# Patient Record
Sex: FEMALE | Race: WHITE | NOT HISPANIC OR LATINO | Employment: FULL TIME | URBAN - NONMETROPOLITAN AREA
[De-identification: names, ages, dates, MRNs, and addresses within clinical notes are randomized per-mention and may not be internally consistent; named-entity substitution may affect disease eponyms.]

---

## 2017-01-12 ENCOUNTER — HOSPITAL ENCOUNTER (OUTPATIENT)
Dept: NUCLEAR MEDICINE | Facility: HOSPITAL | Age: 37
Discharge: HOME | End: 2017-01-12

## 2017-01-12 DIAGNOSIS — E05.90 HYPERTHYROIDISM: ICD-10-CM

## 2017-01-12 RX ORDER — SODIUM IODIDE I 123 100 UCI/1
1 CAPSULE, GELATIN COATED ORAL
Status: DISCONTINUED | OUTPATIENT
Start: 2017-01-12 | End: 2017-01-13 | Stop reason: HOSPADM

## 2017-01-13 ENCOUNTER — HOSPITAL ENCOUNTER (OUTPATIENT)
Dept: NUCLEAR MEDICINE | Facility: HOSPITAL | Age: 37
Discharge: HOME | End: 2017-01-13

## 2017-01-13 PROCEDURE — 0 SODIUM IODIDE 3.7 MBQ CAPSULE

## 2017-01-13 PROCEDURE — A9516 IODINE I-123 SOD IODIDE MIC: HCPCS

## 2017-01-23 ENCOUNTER — OFFICE VISIT (OUTPATIENT)
Dept: OTOLARYNGOLOGY | Facility: CLINIC | Age: 37
End: 2017-01-23

## 2017-01-23 VITALS
WEIGHT: 227 LBS | HEART RATE: 86 BPM | BODY MASS INDEX: 42.86 KG/M2 | SYSTOLIC BLOOD PRESSURE: 131 MMHG | RESPIRATION RATE: 22 BRPM | DIASTOLIC BLOOD PRESSURE: 82 MMHG | HEIGHT: 61 IN

## 2017-01-23 DIAGNOSIS — R13.10 DYSPHAGIA, UNSPECIFIED TYPE: Primary | ICD-10-CM

## 2017-01-23 DIAGNOSIS — R06.83 SNORING: ICD-10-CM

## 2017-01-23 DIAGNOSIS — J34.3 HYPERTROPHY OF INFERIOR NASAL TURBINATE: ICD-10-CM

## 2017-01-23 DIAGNOSIS — E05.90 HYPERTHYROIDISM: ICD-10-CM

## 2017-01-23 DIAGNOSIS — E01.0 THYROMEGALY: ICD-10-CM

## 2017-01-23 DIAGNOSIS — K21.9 LARYNGOPHARYNGEAL REFLUX (LPR): ICD-10-CM

## 2017-01-23 PROCEDURE — 99214 OFFICE O/P EST MOD 30 MIN: CPT | Performed by: NURSE PRACTITIONER

## 2017-01-23 NOTE — PROGRESS NOTES
YOB: 1980  Location: [facility]  Location Address: [address] [place] [address], [facility], [address] [place] [address]  Location Phone: [phone]    Chief Complaint   Patient presents with   • Follow-up     sore throat        History of Present Illness  Teena Lopez is a 36 y.o. female.  Teena Lopez is here for evaluation of ENT complaints. The patient has had problems with throat fullness, difficulty swallowing  The symptoms are localized to the central throat.. The patient has had severe symptoms. The symptoms have been present for the last several months . The symptoms are aggravated by  no identifiable factors . The symptoms are improved by antibiotics.  Positive for frequent untreated GERD.   She is positive for snoring negative for ZAC RHI 10.  She is positive for globus sensation. She threw up 5-8 times per day when she was pregnant.  Positive for loud snoring adriana when she is very tired and awakens still sleepy.  Reports she cannot swallow any meat.  She continues to have persistent diff swallowing.     TSH .4  FL Esophagram Complete [[id number]] (Order [id number])   Status: Final result   PACS Images   Radiology Images   Study Result   FL ESOPHAGRAM COMPLETE- 2016 9:12 AM CST      REASON FOR EXAM: dysphagia, food getting stuck; E05.90-Thyrotoxicosis,  unspecified without thyrotoxic crisis or storm      COMPARISON: NONE       FLUORO TIME: 0.6 minutes      NUMBER OF IMAGES: 40      TECHNIQUE: Multiple fluoroscopic spot images and video fluoroscopy were  performed after the oral administration of barium contrast.       FINDINGS: Ingestion of barium contrast under fluoroscopic observation  demonstrates primary peristaltic waves of the esophagus quickly clearing  the barium contrast into the stomach. Mucosal pattern and morphology of  the esophagus is normal. No gastroesophageal reflux was elicited during  the time of examination. There is no definite stricture. However, just  distal to the epiglottis,  there is a short segment of esophageal lumen  that does not distend on any of the images.      IMPRESSION:  No mechanical obstruction is definitively visualized. There is a short  segment of esophagus that does not completely distend in the cervical  esophagus, just distal to the epiglottis. GI consultation is  recommended.  This report was finalized on 2016 09:26 by Dr. Eduardo Navarrete MD.     NM thyroid uptake and scan [[id number]] (Order [id number])   Status: Final result   PACS Images   Radiology Images   Study Result   HISTORY: Hyperthyroidism      FINDINGS: Following ingestion of 480 uCi of I-123 a 4 hour and 24-hour  uptake were calculated subtracting background activity. The 4 hour  uptake is 8.3% and 24-hour uptake is 18.4% both of which are within  normal limits. Planar imaging was also obtained of the thyroid gland  demonstrating homogeneous uptake of the radiopharmaceutical. The thyroid  gland is mildly enlarged with the right lobe measuring 5.6 cm and left  lobe 5.5 cm in eanc-rn-hefl length. No discrete hot or cold nodules are  demonstrated.      IMPRESSION:  1.. Normal 4 and 24 hour uptake following ingestion of the radioactive  capsule.  2. Mild enlargement of the thyroid gland with homogeneous uptake of the  radiopharmaceutical. No discrete hot or cold nodules are present.  This report was finalized on 2017 12:51 by Dr. Chai Toure MD.          Past Medical History   Diagnosis Date   • Allergic rhinitis    • Snoring    • Strep throat        Past Surgical History   Procedure Laterality Date   • Excision lesion       leg   • Cholecystectomy     •  section       x 2         Current Outpatient Prescriptions:   •  omeprazole (priLOSEC) 20 MG capsule, Take 20 mg by mouth 2 (Two) Times a Day., Disp: , Rfl:     Keflex [cephalexin] and Penicillins    Family History   Problem Relation Age of Onset   • No Known Problems Mother        Social History     Social History   • Marital status:       Spouse name: N/A   • Number of children: N/A   • Years of education: N/A     Occupational History   • Not on file.     Social History Main Topics   • Smoking status: Never Smoker   • Smokeless tobacco: Not on file   • Alcohol use No   • Drug use: Defer   • Sexual activity: Not on file     Other Topics Concern   • Not on file     Social History Narrative       Review of Systems   Constitutional: Negative.    HENT:        Strep throat, snoring, reflux, globus sensation   Eyes: Negative.    Cardiovascular: Negative.    Gastrointestinal: Negative.    Endocrine: Negative.    Genitourinary: Negative.    Musculoskeletal: Negative.    Skin: Negative.    Allergic/Immunologic: Negative.    Neurological: Negative.    Hematological: Negative.    Psychiatric/Behavioral: Negative.        Vitals:    01/23/17 1112   BP: 131/82   Pulse: 86   Resp: 22       Objective     Physical Exam    CONSTITUTIONAL: well nourished, overweight alert, oriented, in no acute distress     COMMUNICATION AND VOICE: able to communicate normally, normal voice quality    HEAD: normocephalic, no lesions, atraumatic, no tenderness, no masses     FACE: appearance normal, no lesions, no tenderness, no deformities, facial motion symmetric    SALIVARY GLANDS: parotid glands with no tenderness, no swelling, no masses, submandibular glands with normal size, nontender    EYES: ocular motility normal, eyelids normal, orbits normal, no proptosis, conjunctiva normal , pupils equal, round     EARS:  Hearing: response to conversational voice normal bilaterally   External Ears: auricles without lesions  Otoscopic: tympanic membrane appearance normal, no lesions, no perforation, normal mobility, no fluid    NOSE:  External Nose: structure normal, no tenderness on palpation, no nasal discharge, no lesions, no evidence of trauma, nostrils patent   Intranasal Exam: nasal mucosa edema, vestibule within normal limits, inferior turbinate hypertrophy, nasal septum  midline       ORAL:  Lips: upper and lower lips without lesion   Teeth: dentition within normal limits for age   Gums: gingivae healthy   Oral Mucosa: oral mucosa normal, no mucosal lesions   Floor of Mouth: Warthin’s duct patent, mucosa normal  Tongue: lingual mucosa normal without lesions, normal tongue mobility   Palate: soft and hard palates with normal mucosa and structure  Oropharynx: oropharyngeal mucosa normal    HYPOPHARYNX:   LARYNX: deferred    NECK: large body habitus    THYROID: nodules not palpable, thyroid mildly enlarged    LYMPH NODES: no lymphadenopathy    CHEST/RESPIRATORY: respiratory effort normal, normal breath sounds     CARDIOVASCULAR: rate and rhythm normal, extremities without cyanosis or edema      NEUROLOGIC/PSYCHIATRIC: oriented to time, place and person, mood normal, affect appropriate, CN II-XII intact grossly    Assessment/Plan   Teena was seen today for follow-up.    Diagnoses and all orders for this visit:    Dysphagia, unspecified type  -     Ambulatory Referral to Gastroenterology    Thyromegaly    Laryngopharyngeal reflux (LPR)    Hyperthyroidism    Snoring    Hypertrophy of inferior nasal turbinate      * Surgery not found *  Orders Placed This Encounter   Procedures   • Ambulatory Referral to Gastroenterology     Referral Priority:   Routine     Referral Type:   Consultation     Referral Reason:   Specialty Services Required     Referred to Provider:   Warren Macdonald MD     Requested Specialty:   Gastroenterology     Number of Visits Requested:   1     Return in about 1 year (around 1/23/2018).       Patient Instructions   Referral to GI for further evaluation  Will recheck TSH/US in 1 year.    Call for problems or worsening symptoms  Snoring - nonobstructive.  Nasacort

## 2017-01-23 NOTE — PATIENT INSTRUCTIONS
Referral to GI for further evaluation  Will recheck TSH/US in 1 year.    Call for problems or worsening symptoms  Snoring - nonobstructive.  Nasacort

## 2017-01-23 NOTE — MR AVS SNAPSHOT
"Teena Lopez   2017 11:15 AM   Office Visit    Dept Phone:  [phone]   Encounter #:  [id number]    Provider:  ANASTASIIA Pollard   Department:  [facility]                Your Full Care Plan              Your Updated Medication List          This list is accurate as of: 17 11:34 AM.  Always use your most recent med list.                omeprazole 20 MG capsule   Commonly known as:  priLOSEC               We Performed the Following     Ambulatory Referral to Gastroenterology       You Were Diagnosed With        Codes Comments    Dysphagia, unspecified type    -  Primary ICD-10-CM: R13.10  ICD-9-CM: 787.20       Instructions     None    Patient Instructions History      Upcoming Appointments     Visit Type Date Time Department    FOLLOW UP 2017 11:15 AM [facility]      iMER Signup     [facility] iMER allows you to send messages to your doctor, view your test results, renew your prescriptions, schedule appointments, and more. To sign up, go to [employer] and click on the Sign Up Now link in the New User? box. Enter your iMER Activation Code exactly as it appears below along with the last four digits of your Social Security Number and your Date of Birth () to complete the sign-up process. If you do not sign up before the expiration date, you must request a new code.    iMER Activation Code: [email or web address]  Expires: 2017  4:35 AM    If you have questions, you can email [email or web address] or call [phone] to talk to our iMER staff. Remember, iMER is NOT to be used for urgent needs. For medical emergencies, dial 911.               Other Info from Your Visit           Allergies     Keflex [Cephalexin]      Penicillins        Reason for Visit     Follow-up sore throat       Vital Signs     Blood Pressure Pulse Respirations Height Weight Body Mass Index    131/82 86 22 61\" (154.9 cm) 227 lb (103 kg) " 42.89 kg/m2    Smoking Status                   Never Smoker           Problems and Diagnoses Noted     Difficulty swallowing    -  Primary

## 2017-02-15 ENCOUNTER — OFFICE VISIT (OUTPATIENT)
Dept: GASTROENTEROLOGY | Facility: CLINIC | Age: 37
End: 2017-02-15

## 2017-02-15 VITALS
BODY MASS INDEX: 43.05 KG/M2 | HEART RATE: 88 BPM | SYSTOLIC BLOOD PRESSURE: 118 MMHG | DIASTOLIC BLOOD PRESSURE: 70 MMHG | OXYGEN SATURATION: 99 % | WEIGHT: 228 LBS | HEIGHT: 61 IN

## 2017-02-15 DIAGNOSIS — R93.3 ABNORMAL ESOPHAGRAM: ICD-10-CM

## 2017-02-15 DIAGNOSIS — R13.10 DIFFICULTY SWALLOWING SOLIDS: Primary | ICD-10-CM

## 2017-02-15 DIAGNOSIS — Z78.9 NONSMOKER: ICD-10-CM

## 2017-02-15 DIAGNOSIS — E66.9 OBESITY, UNSPECIFIED OBESITY SEVERITY, UNSPECIFIED OBESITY TYPE: ICD-10-CM

## 2017-02-15 PROCEDURE — 99203 OFFICE O/P NEW LOW 30 MIN: CPT | Performed by: CLINICAL NURSE SPECIALIST

## 2017-02-15 NOTE — PROGRESS NOTES
Chief Complaint   Patient presents with   • GI Problem     New patient ref by Faviola Stapleton NP for problems swallowing     Subjective   HPI  Teena Lopez is a 36 y.o. female who presents with a complaint of difficulty swallowing.  This has been ongoing persistent over the past 6 months. She has difficulty with eggs, meat and most solid foods. It is located in the upper esophageal region. This occurs daily. No nausea or vomiting. No wt loss. Cutting her food up more and time makes it better. No wt loss. She has had to make herself vomit to get relief. No melena. No BRBPR. She has never had Endoscopy evaluation.  She has had an esophagram 2016 it showed no definite stricture however just distal to the epiglosttis there is a short segment of esophageal lumen that does not distend on any of the images.   Past Medical History   Diagnosis Date   • Allergic rhinitis    • Snoring    • Strep throat      Past Surgical History   Procedure Laterality Date   • Excision lesion       leg   • Cholecystectomy     •  section       x 2     Outpatient Prescriptions Marked as Taking for the 2/15/17 encounter (Office Visit) with ANASTASIIA [facility]   Medication Sig Dispense Refill   • omeprazole (priLOSEC) 20 MG capsule Take 20 mg by mouth 2 (Two) Times a Day.       Allergies   Allergen Reactions   • Keflex [Cephalexin]    • Penicillins      Social History     Social History   • Marital status:      Spouse name: N/A   • Number of children: N/A   • Years of education: N/A     Occupational History   • Not on file.     Social History Main Topics   • Smoking status: Never Smoker   • Smokeless tobacco: Not on file   • Alcohol use No   • Drug use: Defer   • Sexual activity: Not on file     Other Topics Concern   • Not on file     Social History Narrative     Family History   Problem Relation Age of Onset   • No Known Problems Mother    • Colon cancer Neg Hx    • Colon polyps Neg Hx      Health Maintenance   Topic Date  "Due   • TDAP/TD VACCINES (1 - Tdap) 07/16/2001   • INFLUENZA VACCINE  08/01/2016     Review of Systems   Constitutional: Negative for activity change, appetite change, chills, diaphoresis, fatigue, fever and unexpected weight change.   HENT: Positive for trouble swallowing. Negative for ear pain, hearing loss, mouth sores, sore throat and voice change.    Eyes: Negative.    Respiratory: Negative for cough, choking, shortness of breath and wheezing.    Cardiovascular: Negative for chest pain and palpitations.   Gastrointestinal: Negative for abdominal pain, blood in stool, constipation, diarrhea, nausea and vomiting.   Endocrine: Negative for cold intolerance and heat intolerance.   Genitourinary: Negative for decreased urine volume, dysuria, frequency, hematuria and urgency.   Musculoskeletal: Negative for back pain, gait problem and myalgias.   Skin: Negative for color change, pallor and rash.   Allergic/Immunologic: Negative for food allergies and immunocompromised state.   Neurological: Negative for dizziness, tremors, seizures, syncope, weakness, light-headedness, numbness and headaches.   Hematological: Negative for adenopathy. Does not bruise/bleed easily.   Psychiatric/Behavioral: Negative for agitation and confusion. The patient is not nervous/anxious.    All other systems reviewed and are negative.    Objective   Vitals:    02/15/17 1312   BP: 118/70   Pulse: 88   SpO2: 99%   Weight: 228 lb (103 kg)   Height: 61\" (154.9 cm)     Body mass index is 43.08 kg/(m^2).  Physical Exam   Constitutional: She is oriented to person, place, and time. She appears well-developed and well-nourished.   HENT:   Head: Normocephalic and atraumatic.   Eyes: Pupils are equal, round, and reactive to light.   Neck: Normal range of motion. Neck supple. No tracheal deviation present.   Cardiovascular: Normal rate, regular rhythm and normal heart sounds.  Exam reveals no gallop and no friction rub.    No murmur " heard.  Pulmonary/Chest: Effort normal and breath sounds normal. No respiratory distress. She has no wheezes. She has no rales. She exhibits no tenderness.   Abdominal: Soft. Bowel sounds are normal. She exhibits no distension. There is no hepatosplenomegaly. There is no tenderness. There is no rigidity, no rebound and no guarding.   Musculoskeletal: Normal range of motion. She exhibits no edema, tenderness or deformity.   Neurological: She is alert and oriented to person, place, and time. She has normal reflexes.   Skin: Skin is warm and dry. No rash noted. No pallor.   Psychiatric: She has a normal mood and affect. Her behavior is normal. Judgment and thought content normal.     Assessment/Plan   Teena was seen today for gi problem.    Diagnoses and all orders for this visit:    Difficulty swallowing solids    Abnormal esophagram  -     Case Request; Standing  -     Implement Anesthesia Orders Day of Procedure; Standing  -     Obtain Informed Consent; Standing  -     Verify Informed Consent; Standing  -     Case Request    Nonsmoker    Obesity, unspecified obesity severity, unspecified obesity type      ESOPHAGOGASTRODUODENOSCOPY WITH ANESTHESIA (N/A)  EMR Dragon/transcription disclaimer: Much of this encounter note is electronic transcription/translation of spoken language to printed text. The electronic translation of spoken language may be erroneous, or at times, nonsensical words or phrases may be inadvertently transcribed. Although I have reviewed the note for such errors, some may still exist.  Body mass index is 43.08 kg/(m^2).  Return if symptoms worsen or fail to improve.  Instructed on mechanical soft diet.

## 2017-03-13 ENCOUNTER — TELEPHONE (OUTPATIENT)
Dept: GASTROENTEROLOGY | Facility: CLINIC | Age: 37
End: 2017-03-13

## 2017-04-19 ENCOUNTER — OFFICE VISIT (OUTPATIENT)
Dept: FAMILY MEDICINE CLINIC | Age: 37
End: 2017-04-19
Payer: COMMERCIAL

## 2017-04-19 VITALS
HEART RATE: 89 BPM | TEMPERATURE: 98.2 F | HEIGHT: 61 IN | DIASTOLIC BLOOD PRESSURE: 70 MMHG | BODY MASS INDEX: 42.67 KG/M2 | SYSTOLIC BLOOD PRESSURE: 100 MMHG | WEIGHT: 226 LBS | OXYGEN SATURATION: 98 %

## 2017-04-19 DIAGNOSIS — J02.9 SORE THROAT: Primary | ICD-10-CM

## 2017-04-19 DIAGNOSIS — R05.9 COUGH: ICD-10-CM

## 2017-04-19 DIAGNOSIS — J00 HEAD COLD: ICD-10-CM

## 2017-04-19 LAB
INFLUENZA A ANTIBODY: NORMAL
INFLUENZA B ANTIBODY: NORMAL
S PYO AG THROAT QL: NORMAL

## 2017-04-19 PROCEDURE — 87804 INFLUENZA ASSAY W/OPTIC: CPT | Performed by: NURSE PRACTITIONER

## 2017-04-19 PROCEDURE — 87880 STREP A ASSAY W/OPTIC: CPT | Performed by: NURSE PRACTITIONER

## 2017-04-19 PROCEDURE — 99213 OFFICE O/P EST LOW 20 MIN: CPT | Performed by: NURSE PRACTITIONER

## 2017-04-19 RX ORDER — LORATADINE 10 MG/1
10 TABLET ORAL DAILY
Qty: 30 TABLET | Refills: 1 | Status: SHIPPED | OUTPATIENT
Start: 2017-04-19 | End: 2017-08-29 | Stop reason: CLARIF

## 2017-04-19 RX ORDER — FLUTICASONE PROPIONATE 50 MCG
2 SPRAY, SUSPENSION (ML) NASAL DAILY
Qty: 1 BOTTLE | Refills: 1 | Status: SHIPPED | OUTPATIENT
Start: 2017-04-19 | End: 2017-08-29 | Stop reason: CLARIF

## 2017-04-19 ASSESSMENT — ENCOUNTER SYMPTOMS
DIARRHEA: 0
EYES NEGATIVE: 1
ORTHOPNEA: 0
WHEEZING: 0
VOMITING: 0
ABDOMINAL PAIN: 0
NAUSEA: 0
SORE THROAT: 1
SHORTNESS OF BREATH: 0
COUGH: 0

## 2017-08-29 ENCOUNTER — OFFICE VISIT (OUTPATIENT)
Dept: FAMILY MEDICINE CLINIC | Age: 37
End: 2017-08-29
Payer: COMMERCIAL

## 2017-08-29 VITALS
WEIGHT: 226 LBS | DIASTOLIC BLOOD PRESSURE: 82 MMHG | HEIGHT: 61 IN | BODY MASS INDEX: 42.67 KG/M2 | SYSTOLIC BLOOD PRESSURE: 124 MMHG

## 2017-08-29 DIAGNOSIS — Z91.018 FOOD ALLERGY: Primary | ICD-10-CM

## 2017-08-29 DIAGNOSIS — R22.1 THROAT SWELLING: ICD-10-CM

## 2017-08-29 DIAGNOSIS — H65.06 RECURRENT ACUTE SEROUS OTITIS MEDIA OF BOTH EARS: ICD-10-CM

## 2017-08-29 DIAGNOSIS — Z83.79 FAMILY HISTORY OF CELIAC DISEASE: ICD-10-CM

## 2017-08-29 DIAGNOSIS — Z91.018 FOOD ALLERGY: ICD-10-CM

## 2017-08-29 PROCEDURE — 99214 OFFICE O/P EST MOD 30 MIN: CPT | Performed by: NURSE PRACTITIONER

## 2017-08-29 RX ORDER — LORATADINE AND PSEUDOEPHEDRINE 10; 240 MG/1; MG/1
1 TABLET, EXTENDED RELEASE ORAL DAILY PRN
Qty: 20 TABLET | Refills: 2 | Status: ON HOLD | OUTPATIENT
Start: 2017-08-29 | End: 2022-04-25 | Stop reason: ALTCHOICE

## 2017-08-29 ASSESSMENT — ENCOUNTER SYMPTOMS
SPUTUM PRODUCTION: 0
HEMOPTYSIS: 0
RHINORRHEA: 0
GASTROINTESTINAL NEGATIVE: 1
SHORTNESS OF BREATH: 1
COUGH: 0
EYES NEGATIVE: 1
ORTHOPNEA: 0
BACK PAIN: 0

## 2017-08-29 ASSESSMENT — PATIENT HEALTH QUESTIONNAIRE - PHQ9
2. FEELING DOWN, DEPRESSED OR HOPELESS: 0
SUM OF ALL RESPONSES TO PHQ QUESTIONS 1-9: 0
1. LITTLE INTEREST OR PLEASURE IN DOING THINGS: 0
SUM OF ALL RESPONSES TO PHQ9 QUESTIONS 1 & 2: 0

## 2017-09-01 LAB — CELIAC PANEL: 12 UNITS (ref 0–19)

## 2017-09-05 ENCOUNTER — TELEPHONE (OUTPATIENT)
Dept: FAMILY MEDICINE CLINIC | Age: 37
End: 2017-09-05

## 2017-10-10 ENCOUNTER — OFFICE VISIT (OUTPATIENT)
Dept: FAMILY MEDICINE CLINIC | Age: 37
End: 2017-10-10
Payer: COMMERCIAL

## 2017-10-10 ENCOUNTER — APPOINTMENT (OUTPATIENT)
Dept: CT IMAGING | Age: 37
End: 2017-10-10
Payer: COMMERCIAL

## 2017-10-10 ENCOUNTER — APPOINTMENT (OUTPATIENT)
Dept: GENERAL RADIOLOGY | Age: 37
End: 2017-10-10
Payer: COMMERCIAL

## 2017-10-10 ENCOUNTER — HOSPITAL ENCOUNTER (EMERGENCY)
Age: 37
Discharge: HOME | End: 2017-10-10
Attending: FAMILY MEDICINE
Payer: COMMERCIAL

## 2017-10-10 VITALS
TEMPERATURE: 97.8 F | DIASTOLIC BLOOD PRESSURE: 77 MMHG | HEART RATE: 76 BPM | HEIGHT: 61 IN | RESPIRATION RATE: 16 BRPM | BODY MASS INDEX: 41.91 KG/M2 | WEIGHT: 222 LBS | OXYGEN SATURATION: 97 % | SYSTOLIC BLOOD PRESSURE: 101 MMHG

## 2017-10-10 VITALS
HEIGHT: 61 IN | BODY MASS INDEX: 42.1 KG/M2 | DIASTOLIC BLOOD PRESSURE: 70 MMHG | WEIGHT: 223 LBS | SYSTOLIC BLOOD PRESSURE: 108 MMHG

## 2017-10-10 DIAGNOSIS — R55 NEAR SYNCOPE: ICD-10-CM

## 2017-10-10 DIAGNOSIS — R29.898 BILATERAL ARM WEAKNESS: ICD-10-CM

## 2017-10-10 DIAGNOSIS — G89.29 CHRONIC NONINTRACTABLE HEADACHE, UNSPECIFIED HEADACHE TYPE: Primary | ICD-10-CM

## 2017-10-10 DIAGNOSIS — R26.89 STUMBLING GAIT: ICD-10-CM

## 2017-10-10 DIAGNOSIS — R20.0 NUMBNESS: ICD-10-CM

## 2017-10-10 DIAGNOSIS — M54.2 NECK PAIN, BILATERAL: ICD-10-CM

## 2017-10-10 DIAGNOSIS — R07.89 OTHER CHEST PAIN: Primary | ICD-10-CM

## 2017-10-10 DIAGNOSIS — M54.12 CERVICAL RADICULOPATHY: ICD-10-CM

## 2017-10-10 DIAGNOSIS — R29.898 RIGHT ARM WEAKNESS: ICD-10-CM

## 2017-10-10 DIAGNOSIS — H53.9 VISUAL CHANGES: ICD-10-CM

## 2017-10-10 DIAGNOSIS — R51.9 CHRONIC NONINTRACTABLE HEADACHE, UNSPECIFIED HEADACHE TYPE: Primary | ICD-10-CM

## 2017-10-10 DIAGNOSIS — R42 DIZZINESS: ICD-10-CM

## 2017-10-10 LAB
ALBUMIN SERPL-MCNC: 4.2 G/DL (ref 3.5–5.2)
ALP BLD-CCNC: 63 U/L (ref 35–104)
ALT SERPL-CCNC: 18 U/L (ref 5–33)
AMPHETAMINE SCREEN, URINE: NEGATIVE
ANION GAP SERPL CALCULATED.3IONS-SCNC: 15 MMOL/L (ref 7–19)
AST SERPL-CCNC: 15 U/L (ref 5–32)
BARBITURATE SCREEN URINE: NEGATIVE
BASOPHILS ABSOLUTE: 0 K/UL (ref 0–0.2)
BASOPHILS RELATIVE PERCENT: 0.4 % (ref 0–1)
BENZODIAZEPINE SCREEN, URINE: NEGATIVE
BILIRUB SERPL-MCNC: 0.3 MG/DL (ref 0.2–1.2)
BILIRUBIN URINE: NEGATIVE
BLOOD, URINE: NEGATIVE
BUN BLDV-MCNC: 13 MG/DL (ref 6–20)
CALCIUM SERPL-MCNC: 9 MG/DL (ref 8.6–10)
CANNABINOID SCREEN URINE: NEGATIVE
CHLORIDE BLD-SCNC: 102 MMOL/L (ref 98–111)
CLARITY: CLEAR
CO2: 23 MMOL/L (ref 22–29)
COCAINE METABOLITE SCREEN URINE: NEGATIVE
COLOR: YELLOW
CREAT SERPL-MCNC: 0.6 MG/DL (ref 0.5–0.9)
EOSINOPHILS ABSOLUTE: 0.2 K/UL (ref 0–0.6)
EOSINOPHILS RELATIVE PERCENT: 1.8 % (ref 0–5)
GFR NON-AFRICAN AMERICAN: >60
GLUCOSE BLD-MCNC: 84 MG/DL (ref 74–109)
GLUCOSE URINE: NEGATIVE MG/DL
HCT VFR BLD CALC: 37.6 % (ref 37–47)
HEMOGLOBIN: 12.7 G/DL (ref 12–16)
KETONES, URINE: NEGATIVE MG/DL
LEUKOCYTE ESTERASE, URINE: NEGATIVE
LYMPHOCYTES ABSOLUTE: 3 K/UL (ref 1.1–4.5)
LYMPHOCYTES RELATIVE PERCENT: 31.7 % (ref 20–40)
Lab: NORMAL
MCH RBC QN AUTO: 30.5 PG (ref 27–31)
MCHC RBC AUTO-ENTMCNC: 33.8 G/DL (ref 33–37)
MCV RBC AUTO: 90.2 FL (ref 81–99)
MONOCYTES ABSOLUTE: 0.7 K/UL (ref 0–0.9)
MONOCYTES RELATIVE PERCENT: 7.2 % (ref 0–10)
NEUTROPHILS ABSOLUTE: 5.6 K/UL (ref 1.5–7.5)
NEUTROPHILS RELATIVE PERCENT: 58.7 % (ref 50–65)
NITRITE, URINE: NEGATIVE
OPIATE SCREEN URINE: NEGATIVE
PDW BLD-RTO: 12.6 % (ref 11.5–14.5)
PERFORMED ON: NORMAL
PERFORMED ON: NORMAL
PH UA: 6
PLATELET # BLD: 301 K/UL (ref 130–400)
PMV BLD AUTO: 9.5 FL (ref 9.4–12.3)
POC TROPONIN I: 0 NG/ML (ref 0–0.08)
POC TROPONIN I: 0 NG/ML (ref 0–0.08)
POTASSIUM SERPL-SCNC: 3.9 MMOL/L (ref 3.5–5)
PROTEIN UA: NEGATIVE MG/DL
RBC # BLD: 4.17 M/UL (ref 4.2–5.4)
SODIUM BLD-SCNC: 140 MMOL/L (ref 136–145)
SPECIFIC GRAVITY UA: 1.01
TOTAL PROTEIN: 7.3 G/DL (ref 6.6–8.7)
UROBILINOGEN, URINE: 0.2 E.U./DL
WBC # BLD: 9.6 K/UL (ref 4.8–10.8)

## 2017-10-10 PROCEDURE — 80307 DRUG TEST PRSMV CHEM ANLYZR: CPT

## 2017-10-10 PROCEDURE — 6360000002 HC RX W HCPCS: Performed by: FAMILY MEDICINE

## 2017-10-10 PROCEDURE — 85025 COMPLETE CBC W/AUTO DIFF WBC: CPT

## 2017-10-10 PROCEDURE — 71010 XR CHEST PORTABLE: CPT

## 2017-10-10 PROCEDURE — 99285 EMERGENCY DEPT VISIT HI MDM: CPT

## 2017-10-10 PROCEDURE — 72125 CT NECK SPINE W/O DYE: CPT

## 2017-10-10 PROCEDURE — 80053 COMPREHEN METABOLIC PANEL: CPT

## 2017-10-10 PROCEDURE — 99214 OFFICE O/P EST MOD 30 MIN: CPT | Performed by: NURSE PRACTITIONER

## 2017-10-10 PROCEDURE — 70450 CT HEAD/BRAIN W/O DYE: CPT

## 2017-10-10 PROCEDURE — 84484 ASSAY OF TROPONIN QUANT: CPT

## 2017-10-10 PROCEDURE — 81003 URINALYSIS AUTO W/O SCOPE: CPT

## 2017-10-10 PROCEDURE — 36415 COLL VENOUS BLD VENIPUNCTURE: CPT

## 2017-10-10 PROCEDURE — 93005 ELECTROCARDIOGRAM TRACING: CPT

## 2017-10-10 PROCEDURE — 96374 THER/PROPH/DIAG INJ IV PUSH: CPT

## 2017-10-10 PROCEDURE — 99283 EMERGENCY DEPT VISIT LOW MDM: CPT | Performed by: EMERGENCY MEDICINE

## 2017-10-10 RX ORDER — LORAZEPAM 1 MG/1
1 TABLET ORAL EVERY 8 HOURS PRN
Qty: 15 TABLET | Refills: 0 | Status: SHIPPED | OUTPATIENT
Start: 2017-10-10 | End: 2017-11-09

## 2017-10-10 RX ORDER — LORAZEPAM 2 MG/ML
1 INJECTION INTRAMUSCULAR ONCE
Status: COMPLETED | OUTPATIENT
Start: 2017-10-10 | End: 2017-10-10

## 2017-10-10 RX ADMIN — LORAZEPAM 1 MG: 2 INJECTION INTRAMUSCULAR; INTRAVENOUS at 20:20

## 2017-10-10 ASSESSMENT — ENCOUNTER SYMPTOMS
HEMATOCHEZIA: 0
EYES NEGATIVE: 1
COUGH: 0
ABDOMINAL PAIN: 0
RESPIRATORY NEGATIVE: 1
DIARRHEA: 0
NAUSEA: 0
HEMOPTYSIS: 0
GASTROINTESTINAL NEGATIVE: 1
SPUTUM PRODUCTION: 0
VOMITING: 0
BACK PAIN: 0
ORTHOPNEA: 0
VISUAL CHANGE: 0
SHORTNESS OF BREATH: 0
COUGH: 0

## 2017-10-10 ASSESSMENT — PAIN DESCRIPTION - LOCATION: LOCATION: CHEST

## 2017-10-10 ASSESSMENT — PAIN SCALES - GENERAL
PAINLEVEL_OUTOF10: 2
PAINLEVEL_OUTOF10: 5

## 2017-10-10 NOTE — PROGRESS NOTES
Subjective:      Patient ID: Anuradha Casiano is a 30 y.o. female    Patient presents for follow up on several issues. She has seen the allergy doctor and had testing and wasn't found to have any allergies, including no PCN allergy she always thought she had. She went back to her thyroid doctor and had an ultrasound due to increased neck size. She was found to have three small fluid filled cysts, but nothing significant. She is reporting issues with neck pain, both on the side in the area of her cervical lymph nodes and pain in the back of her neck. She reports stress at work, but she doesn't know if that is causing the issues. She reports increased headaches. When she has them they are mostly either left sided or occipital.  It feels like a stabbing/throbbing pain. She is light and noise sensitive with the headaches. She also reports several recent episodes of \"spells\". She states that she will have sudden onset of headache, visual disturbance where she feels she cannot see, everything is blurry and then she will feel like she is going to pass out, everything lindsay goes black, but she doesn't fully pass out. She is also noticing heaviness and weakness in her arms. They tingle and feel weak bilaterally, right worse than left. She states that her  has noticed that when she walks she seems to have a stumbling gait. She will also seem to notice at times that she has trouble talking, her speech seems thick. Review of Systems   Constitutional: Positive for malaise/fatigue. Negative for weight loss. HENT: Negative. Negative for congestion, ear pain and nosebleeds. Eyes: Negative. Respiratory: Negative. Negative for cough, hemoptysis and sputum production. Cardiovascular: Negative. Negative for chest pain, palpitations, orthopnea and claudication. Gastrointestinal: Negative. Genitourinary: Negative. Negative for dysuria, frequency and hematuria.    Musculoskeletal: Positive for She has no cervical adenopathy (no palpable nodes,but she is tender along the left cervical lymph node chain). Neurological: She is alert and oriented to person, place, and time. Skin: Skin is warm and dry. No rash noted. No erythema. Psychiatric: Her behavior is normal. Thought content normal. Her mood appears anxious. She exhibits a depressed mood. /70   Ht 5' 1\" (1.549 m)   Wt 223 lb (101.2 kg)   BMI 42.14 kg/m²     Assessment:      1. Chronic nonintractable headache, unspecified headache type    2. Visual changes    3. Near syncope    4. Dizziness    5. Bilateral arm weakness    6. Neck pain, bilateral    7. Cervical radiculopathy    8. Right arm weakness    9. Stumbling gait            Plan:          Orders Placed This Encounter   Procedures    MRI Brain W WO Contrast     Standing Status:   Future     Standing Expiration Date:   10/10/2018     Scheduling Instructions:      Anupam May not Monday or Wednesday next week    MRI Cervical Spine Wo Conrast     Standing Status:   Future     Standing Expiration Date:   10/10/2018     Scheduling Instructions:      Anupam Inmanone on a Tuesday or thursday       Follow up based on test results, may need referral to neurology for further evaluation as well.

## 2017-10-11 LAB
EKG P AXIS: 13 DEGREES
EKG P AXIS: 31 DEGREES
EKG P-R INTERVAL: 132 MS
EKG P-R INTERVAL: 136 MS
EKG Q-T INTERVAL: 384 MS
EKG Q-T INTERVAL: 388 MS
EKG QRS DURATION: 100 MS
EKG QRS DURATION: 96 MS
EKG QTC CALCULATION (BAZETT): 412 MS
EKG QTC CALCULATION (BAZETT): 413 MS
EKG T AXIS: -1 DEGREES
EKG T AXIS: 3 DEGREES

## 2017-10-11 NOTE — ED NOTES
Assessment:    Pt respirations even and unlabored, skin color within normal limits. Skin is warm and dry. Capillary refill less than 2 seconds. Pt reports numbness in right arm with weakness that has been ongoing. Saw PCP today for this. States outpt MRI of brain and c spine ordered. After she left the office, she developed numbness in left arm as well. Pt reports then experiencing neck pain that went down into upper chest.     Bowel sounds within normal limits. Abdomen soft and nontender. Alert and oriented x 4. Pt is in no acute distress. No edema noted to extremities. Pt CORDOVA but states her hands and arms feel weak and numb.  weak but otherwise strength intact.         Anibal Smith RN  10/10/17 1914

## 2017-10-11 NOTE — ED PROVIDER NOTES
[facility] EMERGENCY DEPT  eMERGENCY dEPARTMENT eNCOUnter      Pt Name: Yanna Aleman  MRN: [id number]  [place] 1980  Date of evaluation: 10/10/2017  Provider: MD Arnoldo Tipton       Chief Complaint   Patient presents with    Chest Pain    Numbness     right and left arm          HISTORY OF PRESENT ILLNESS   (Location/Symptom, Timing/Onset, Context/Setting, Quality, Duration, Modifying Factors, Severity)  Note limiting factors. Yanna Aleman is a 30 y.o. female who presents to the emergency department with R arm tingling/numbness since yesterday that has now spread to L arm as well starting today. Also had MRI scheduled for Tuesday with PCP for head and neck and would like to get it done today but discussed difficulty getting MRI at this time of day in ER unless medically warranted and decided to wait until CT head completed prior to seeing if MRI needed. The history is provided by the patient and the spouse. No  was used.    Extremity Weakness   Severity:  Mild (tingling/numbness)  Onset quality:  Sudden  Timing:  Constant  Progression:  Worsening  Chronicity:  New  Context: not alcohol use, not allergies, not change in medication, not decreased sleep, not dehydration, not drug use, not increased activity, not pinched nerve, not recent infection, not stress and not urinary tract infection    Relieved by:  Nothing  Worsened by:  Nothing  Ineffective treatments:  None tried  Associated symptoms: extremity numbness    Associated symptoms: no abdominal pain, no anorexia, no aphasia, no arthralgias, no ataxia, no chest pain, no cough, no diarrhea, no difficulty walking, no dizziness, no drooling, no dysphagia, no dysuria, no falls, no fever, no foul-smelling urine, no frequency, no headaches, no hematochezia, no lethargy, no loss of consciousness, no melena, no myalgias, no nausea, no near-syncope, no seizures, no sensory-motor deficit, no shortness of breath, no stroke symptoms, no syncope, no urgency, no vision change and no vomiting    Risk factors: no anemia, no congestive heart failure, no coronary artery disease, no diabetes, no excessive menstruation, no family hx of stroke, no heart disease, no neurologic disease, no new medications and no recent stressors        Nursing Notes were reviewed. REVIEW OF SYSTEMS    (2-9 systems for level 4, 10 or more for level 5)     Review of Systems   Constitutional: Negative for fever. HENT: Negative for drooling. Respiratory: Negative for cough and shortness of breath. Cardiovascular: Negative for chest pain, syncope and near-syncope. Gastrointestinal: Negative for abdominal pain, anorexia, diarrhea, dysphagia, hematochezia, melena, nausea and vomiting. Genitourinary: Negative for dysuria, frequency and urgency. Musculoskeletal: Negative for arthralgias, falls and myalgias. Neurological: Negative for dizziness, seizures, loss of consciousness and headaches. All other systems reviewed and are negative. PAST MEDICAL HISTORY   No past medical history on file. SURGICAL HISTORY       Past Surgical History:   Procedure Laterality Date     SECTION  ,     CHOLECYSTECTOMY      LEG SURGERY      right, mass, normal    TUBAL LIGATION           CURRENT MEDICATIONS       Previous Medications    LORATADINE-PSEUDOEPHEDRINE (CLARITIN-D 24 HOUR)  MG PER EXTENDED RELEASE TABLET    Take 1 tablet by mouth daily as needed (allergies and congestion)       ALLERGIES     Review of patient's allergies indicates no known allergies. FAMILY HISTORY     No family history on file.        SOCIAL HISTORY       Social History     Social History    Marital status:      Spouse name: N/A    Number of children: N/A    Years of education: N/A     Social History Main Topics    Smoking status: Never Smoker    Smokeless tobacco: Never Used    Alcohol use No    Drug use: No    Sexual Emergency Department Physician who either signs or Co-signs this chart in the absence of a cardiologist.      RADIOLOGY:   Non-plain film images such as CT, Ultrasound and MRI are read by the radiologist. Plain radiographic images are visualized and preliminarily interpreted by the emergency physician with the below findings:    XR Chest Portable   Final Result   1. No radiographic evidence of acute cardiopulmonary process. Signed by Dr Carlos Armenta on 10/10/2017 7:12 PM      CT Head WO Contrast    (Results Pending)   CT Cervical Spine WO Contrast    (Results Pending)           LABS:  Labs Reviewed   CBC WITH AUTO DIFFERENTIAL - Abnormal; Notable for the following:        Result Value    RBC 4.17 (*)     All other components within normal limits   COMPREHENSIVE METABOLIC PANEL   TROPONIN   URINALYSIS   URINE DRUG SCREEN   POCT TROPONIN   POCT TROPONIN   POCT VENOUS       All other labs were within normal range or not returned as of this dictation. EMERGENCY DEPARTMENT COURSE and DIFFERENTIAL DIAGNOSIS/MDM:   Vitals:    Vitals:    10/10/17 1917 10/10/17 1930 10/10/17 1945 10/10/17 2003   BP: 104/69 109/61 113/71 129/77   Pulse: 76 78 83 77   Resp: 18 16 18 18   Temp:    97.5 °F (36.4 °C)   TempSrc:    Oral   SpO2: 96% 94% 90% 96%   Weight:       Height:           MDM    Reassessment  ***    CONSULTS:  None    PROCEDURES:  Unless otherwise noted below, none     Procedures    FINAL IMPRESSION    No diagnosis found. DISPOSITION/PLAN   DISPOSITION     PATIENT REFERRED TO:  No follow-up provider specified.     DISCHARGE MEDICATIONS:  New Prescriptions    No medications on file          (Please note that portions of this note were completed with a voice recognition program.  Efforts were made to edit the dictations but occasionally words are mis-transcribed.)    Everett Benítez MD (electronically signed)  Attending Emergency Physician

## 2017-10-11 NOTE — ED NOTES
Pt reports numbness improving after ativan, just feeling tired now     Virginia Barillas RN  10/10/17 5154

## 2017-10-11 NOTE — ED PROVIDER NOTES
[facility] EMERGENCY DEPT  eMERGENCY dEPARTMENT eNCOUnter      Pt Name: El Alonso  MRN: [id number]  [place] 1980  Date of evaluation: 10/10/2017  Provider: Selene Dumont MD    [address] [place] [address]       Chief Complaint   Patient presents with    Chest Pain    Numbness     right and left arm          PHYSICAL EXAM    (up to 7 for level 4, 8 or more for level 5)     ED Triage Vitals   BP Temp Temp Source Pulse Resp SpO2 Height Weight   10/10/17 1817 10/10/17 1817 10/10/17 2003 10/10/17 1817 10/10/17 1817 10/10/17 1817 10/10/17 1817 10/10/17 1817   128/79 98 °F (36.7 °C) Oral 81 19 99 % 5' 1\" (1.549 m) 222 lb (100.7 kg)       Physical Exam    DIAGNOSTIC RESULTS     EKG: All EKG's are interpreted by the Emergency Department Physician who either signs or Co-signs this chart in the absence of a cardiologist.    Normal sinus rhythm with no evidence of ischemia. RADIOLOGY:   Non-plain film images such as CT, Ultrasound and MRI are read by the radiologist. Plain radiographic images are visualized and preliminarily interpreted by the emergency physician with the below findings:    I reviewed the findings and shared the results with the patient. CT Cervical Spine WO Contrast   Final Result   1. No evidence of acute osseous injury in the cervical spine. Signed by Dr Mckenna Rhoades on 10/10/2017 8:58 PM      CT Head WO Contrast   Final Result   1. No acute intracranial process. Signed by Dr Mckenna Rhoades on 10/10/2017 8:54 PM      XR Chest Portable   Final Result   1. No radiographic evidence of acute cardiopulmonary process.    Signed by Dr Mckenna Rhoades on 10/10/2017 7:12 PM              LABS:  Labs Reviewed   CBC WITH AUTO DIFFERENTIAL - Abnormal; Notable for the following:        Result Value    RBC 4.17 (*)     All other components within normal limits   COMPREHENSIVE METABOLIC PANEL   URINALYSIS   URINE DRUG SCREEN   TROPONIN   POCT TROPONIN   POCT TROPONIN   POCT VENOUS   POCT VENOUS       All other labs were within normal range or not returned as of this dictation. EMERGENCY DEPARTMENT COURSE and DIFFERENTIAL DIAGNOSIS/MDM:   Vitals:    Vitals:    10/10/17 2003 10/10/17 2018 10/10/17 2045 10/10/17 2100   BP: 129/77 131/76 122/78 126/68   Pulse: 77 89 74 83   Resp: 18 18 16 16   Temp: 97.5 °F (36.4 °C)      TempSrc: Oral      SpO2: 96% 96% 97% 96%   Weight:       Height:           MDM    Reassessment  45-year-old female with atypical numbness right and left arm one side to the other. Scheduled for an MRI next Tuesday. Negative CT tonight. Develop some chest symptoms as well but cardiac workup was negative. She has a primary care provider she is following C safe to discharge this time off at her some Ativan just for extreme stress. She states she has a very stressful job but doesn't think she is anxious. She states she did have a panic attack. We discussed that he don't have to be having panic attacks or have a anxiety diagnosis to succumb to stress. She understands the prescription is not a long-term prescription. CONSULTS:  None    PROCEDURES:  Unless otherwise noted below, none     Procedures    FINAL IMPRESSION      1. Other chest pain    2. Numbness          DISPOSITION/PLAN   DISPOSITION Decision To Discharge    PATIENT REFERRED TO:  Emanuel Sullivan, APRN  [address] [facility]  [phone]      keep your appointment for your MRI. DISCHARGE MEDICATIONS:  New Prescriptions    LORAZEPAM (ATIVAN) 1 MG TABLET    Take 1 tablet by mouth every 8 hours as needed for Anxiety Or extreme stress. Attestation: The Prescription Monitoring Report for this patient was reviewed today.  Flakita Sullivan [id number]) Krish Parks MD)    (Please note that portions of this note were completed with a voice recognition program.  Efforts were made to edit the dictations but occasionally words are mis-transcribed.)    Krish Parks MD (electronically signed)  Attending Emergency Physician       Ekaterina Arias Jona Mata MD  10/10/17 6730       Des Simpson MD  10/10/17 7843

## 2017-10-18 ENCOUNTER — TELEPHONE (OUTPATIENT)
Dept: FAMILY MEDICINE CLINIC | Age: 37
End: 2017-10-18

## 2017-10-18 DIAGNOSIS — R55 NEAR SYNCOPE: ICD-10-CM

## 2017-10-18 DIAGNOSIS — R29.898 BILATERAL ARM WEAKNESS: ICD-10-CM

## 2017-10-18 DIAGNOSIS — R26.0 STAGGERING GAIT: ICD-10-CM

## 2017-10-18 DIAGNOSIS — H53.9 VISUAL CHANGES: ICD-10-CM

## 2017-10-18 DIAGNOSIS — R51.9 NONINTRACTABLE EPISODIC HEADACHE, UNSPECIFIED HEADACHE TYPE: Primary | ICD-10-CM

## 2017-10-18 DIAGNOSIS — M54.12 CERVICAL RADICULOPATHY: ICD-10-CM

## 2017-10-18 DIAGNOSIS — M54.2 NECK PAIN: ICD-10-CM

## 2017-10-18 DIAGNOSIS — R42 DIZZINESS: ICD-10-CM

## 2017-10-19 DIAGNOSIS — G89.29 CHRONIC NONINTRACTABLE HEADACHE, UNSPECIFIED HEADACHE TYPE: ICD-10-CM

## 2017-10-19 DIAGNOSIS — H53.9 VISUAL CHANGES: ICD-10-CM

## 2017-10-19 DIAGNOSIS — R42 DIZZINESS: ICD-10-CM

## 2017-10-19 DIAGNOSIS — R29.898 BILATERAL ARM WEAKNESS: ICD-10-CM

## 2017-10-19 DIAGNOSIS — R51.9 CHRONIC NONINTRACTABLE HEADACHE, UNSPECIFIED HEADACHE TYPE: ICD-10-CM

## 2017-10-19 DIAGNOSIS — M54.2 NECK PAIN, BILATERAL: ICD-10-CM

## 2017-10-19 DIAGNOSIS — M54.12 CERVICAL RADICULOPATHY: ICD-10-CM

## 2017-10-19 DIAGNOSIS — R55 NEAR SYNCOPE: ICD-10-CM

## 2017-10-19 DIAGNOSIS — R26.89 STUMBLING GAIT: ICD-10-CM

## 2017-10-19 DIAGNOSIS — R29.898 RIGHT ARM WEAKNESS: ICD-10-CM

## 2018-01-09 ENCOUNTER — OFFICE VISIT (OUTPATIENT)
Dept: NEUROSURGERY | Age: 38
End: 2018-01-09
Payer: COMMERCIAL

## 2018-01-09 VITALS
DIASTOLIC BLOOD PRESSURE: 74 MMHG | HEART RATE: 97 BPM | HEIGHT: 61 IN | SYSTOLIC BLOOD PRESSURE: 126 MMHG | WEIGHT: 228.4 LBS | BODY MASS INDEX: 43.12 KG/M2 | OXYGEN SATURATION: 98 %

## 2018-01-09 DIAGNOSIS — R53.1 WEAKNESS: ICD-10-CM

## 2018-01-09 DIAGNOSIS — R42 DIZZINESS: ICD-10-CM

## 2018-01-09 DIAGNOSIS — R25.1 TREMOR: ICD-10-CM

## 2018-01-09 DIAGNOSIS — R51.9 HEADACHE, UNSPECIFIED HEADACHE TYPE: ICD-10-CM

## 2018-01-09 DIAGNOSIS — R25.1 TREMOR: Primary | ICD-10-CM

## 2018-01-09 LAB
ALBUMIN SERPL-MCNC: 4.8 G/DL (ref 3.5–5.2)
ALP BLD-CCNC: 63 U/L (ref 35–104)
ALT SERPL-CCNC: 21 U/L (ref 5–33)
ANION GAP SERPL CALCULATED.3IONS-SCNC: 17 MMOL/L (ref 7–19)
AST SERPL-CCNC: 18 U/L (ref 5–32)
BASOPHILS ABSOLUTE: 0 K/UL (ref 0–0.2)
BASOPHILS RELATIVE PERCENT: 0.6 % (ref 0–1)
BILIRUB SERPL-MCNC: <0.2 MG/DL (ref 0.2–1.2)
BUN BLDV-MCNC: 17 MG/DL (ref 6–20)
C-REACTIVE PROTEIN: 0.36 MG/DL (ref 0–0.5)
CALCIUM SERPL-MCNC: 9.1 MG/DL (ref 8.6–10)
CHLORIDE BLD-SCNC: 107 MMOL/L (ref 98–111)
CO2: 20 MMOL/L (ref 22–29)
CREAT SERPL-MCNC: 0.6 MG/DL (ref 0.5–0.9)
EOSINOPHILS ABSOLUTE: 0.1 K/UL (ref 0–0.6)
EOSINOPHILS RELATIVE PERCENT: 2.1 % (ref 0–5)
GFR NON-AFRICAN AMERICAN: >60
GLUCOSE BLD-MCNC: 64 MG/DL (ref 74–109)
HCT VFR BLD CALC: 38.6 % (ref 37–47)
HEMOGLOBIN: 12.9 G/DL (ref 12–16)
LYMPHOCYTES ABSOLUTE: 2.3 K/UL (ref 1.1–4.5)
LYMPHOCYTES RELATIVE PERCENT: 33.9 % (ref 20–40)
MCH RBC QN AUTO: 30.5 PG (ref 27–31)
MCHC RBC AUTO-ENTMCNC: 33.4 G/DL (ref 33–37)
MCV RBC AUTO: 91.3 FL (ref 81–99)
MONOCYTES ABSOLUTE: 0.4 K/UL (ref 0–0.9)
MONOCYTES RELATIVE PERCENT: 6.5 % (ref 0–10)
NEUTROPHILS ABSOLUTE: 3.9 K/UL (ref 1.5–7.5)
NEUTROPHILS RELATIVE PERCENT: 56.8 % (ref 50–65)
PDW BLD-RTO: 12.2 % (ref 11.5–14.5)
PLATELET # BLD: 346 K/UL (ref 130–400)
PMV BLD AUTO: 10.3 FL (ref 9.4–12.3)
POTASSIUM SERPL-SCNC: 3.9 MMOL/L (ref 3.5–5)
RBC # BLD: 4.23 M/UL (ref 4.2–5.4)
RHEUMATOID FACTOR: <10 IU/ML
SEDIMENTATION RATE, ERYTHROCYTE: 16 MM/HR (ref 0–20)
SODIUM BLD-SCNC: 144 MMOL/L (ref 136–145)
T4 FREE: 1 NG/DL (ref 0.9–1.7)
TOTAL CK: 52 U/L (ref 26–192)
TOTAL PROTEIN: 7.7 G/DL (ref 6.6–8.7)
TSH SERPL DL<=0.05 MIU/L-ACNC: 0.92 UIU/ML (ref 0.27–4.2)
VITAMIN B-12: 543 PG/ML (ref 211–946)
WBC # BLD: 6.8 K/UL (ref 4.8–10.8)

## 2018-01-09 PROCEDURE — 99204 OFFICE O/P NEW MOD 45 MIN: CPT | Performed by: PSYCHIATRY & NEUROLOGY

## 2018-01-09 NOTE — PROGRESS NOTES
[facility] Neurology Office Note      Patient:   Velia Stuart  MR#:    [id number]  Account Number:                         YOB: 1980  Date of Evaluation:  2018  Time of Note:                          9:24 AM  Primary/Referring Physician:  DAVID Conner   Consulting Physician:  Mary Anne Hunter D.O.    NEW PATIENT CONSULTATION    Chief Complaint   Patient presents with    Dizziness     New patient  Referal patient states she has dizziness, trouble hearing, vision problems,    Headache     also reports having a headache daily, lasting all day     Fatigue     also noted balance issues,     Numbness     reports tingling and numbness in right hand left slightly        Keri Alford is a 30y.o. year old female here for dizziness, headache, fatigue, and numbness. Symptoms started back in 2016, noting swallowing difficulty, and hearing loss, seen by ENT, thyroid US felt abnormal, seen in [facility], then sent ultimately to GI, EGD performed. Then developed headaches, and seen by allergist in [facility]. Noting more fatigue after, also episodes of NANNETTE. Thyroid never requited treatment. MRI completed back in October and was normal.  Seen then by cardiologist and later by rheumatology, no clear diagnosis has been made. Noting shaking, tremors, trouble swallowing, headaches, weakness, dizziness, balance difficulty, slurred speech, vision problems, and numbness and tingling in the arms.       PMH  Past Surgical History:   Procedure Laterality Date     SECTION  ,      SECTION      x3    CHOLECYSTECTOMY  2009    LEG SURGERY  1990    right, mass, normal    LEG SURGERY      TUBAL LIGATION  2013    WISDOM TOOTH EXTRACTION Bilateral        Family History   Problem Relation Age of Onset    High Blood Pressure Mother     Celiac Disease Mother     High Cholesterol Mother     Diabetes Maternal Grandmother     Stroke Maternal Grandmother     Heart Attack Maternal Grandfather     Cancer Paternal Grandfather        Social History     Social History    Marital status:      Spouse name: N/A    Number of children: N/A    Years of education: N/A     Occupational History    Not on file. Social History Main Topics    Smoking status: Never Smoker    Smokeless tobacco: Never Used    Alcohol use No    Drug use: No    Sexual activity: Yes     Other Topics Concern    Not on file     Social History Narrative    No narrative on file       Current Outpatient Prescriptions   Medication Sig Dispense Refill    loratadine-pseudoephedrine (CLARITIN-D 24 HOUR)  MG per extended release tablet Take 1 tablet by mouth daily as needed (allergies and congestion) 20 tablet 2     No current facility-administered medications for this visit.       ALLERGIES  No Known Allergies      REVIEW OF SYSTEMS    Constitutional: []Fever []Sweats []Chills [] Recent Injury [x]Fatigue  [x] Denies all unless marked  HEENT:[]Headache  [] Head Injury  [] Sore Throat  [] Ear Pain  [x]Dizziness [] Hearing Loss []Trouble Swallowing []Voice Change  [] Eye Pain  [] Eye Injection []Visual Disturbance  [] Ptosis  [] Tinnitus [x] Denies all unless marked  Spine:  [] Neck pain  [] Back pain  [] Sciaticia  [x] Denies all unless marked  Cardiovascular:[]Chest Pain []Palpitations [] Heart Disease  [x] Denies all unless marked  Pulmonary: []Shortness of Breath []Cough  []Wheezing  [x] Denies all unless marked  Gastrointestinal:  []Abdominal Pain  []Blood in Stool  []Diarrhea []Constipation []Nausea  []Vomiting  [x] Denies all unless marked  Genitourinary:  [] Dysuria [] Enuresis [] Incontinence [] Frequency/Urgency  [] Hematuria  [x] Denies all unless marked  Musculoskeletal: [] Joint Pain [] Myalgias [] Joint Swelling [] Neck Stiffness  [x] Denies all unless marked  Skin:[] Rash [] Pallor [] Color Change [] Wound  [x] Denies all unless marked  Neurological:[x] Visual Disturbance [] Double Vision [] Slurred Speech [] Trouble swallowing  [x] Vertigo [x] Tingling [x] Numbness [x] Weakness [x] Loss of Balance [x] Loss of Consciousness [x] Memory Loss [x] Tremor [] Seizure [] Syncope  [] Ataxia  [x] Denies all unless marked  Psychiatric/Behavioral:[] Depression [] Anxiety [] Confusion [] Agitation [] Behavior Problems  [] Hallucinations  [] Suicidal idiation  [x] Denies all unless marked  Sleep: []  Insomnia [] Sleep Disturbance [] Snoring [] Restless Legs [] Daytime Sleeping [] Sleep Apnea  [x] Denies all unless marked  Hematological:[] Adenopathy [] Bruises/Bleeds Easily  [x] Denies all unless marked  Endocrine: [] Cold Intolerance [] Heat Intolerance [] Polydipsia [] Polyphagia [] Polyuria  [x]Denies all unless marked  Allergic/Immunologic:[] Environmental Allergies [] Food Allergies [] Immunocompromised state  [x] Denies all unless marked    PHYSICAL EXAM  /74   Pulse 97   Ht 5' 1\" (1.549 m)   Wt 228 lb 6.4 oz (103.6 kg)   SpO2 98%   BMI 43.16 kg/m²     Constitutional  No acute distress    HEENT- Conjunctiva normal.  No scars, masses, or lesions over external nose or ears, no neck masses noted, no jugular vein distension, no bruit  Cardiac- Regular rate and rhythum  Pulmonary- Clear to auscultation, good expansion, normal effort without use of accessory muscles  Musculoskeletal  No significant wasting of muscles noted, no bony deformities  Extremities - No clubbing, cyanosis or edema  Skin  Warm, dry, and intact. No rash, erythema, or pallor  Psychiatric  Mood, affect, and behavior appear normal      NEUROLOGICAL EXAM    Mental status   [x]Awake, alert, oriented   [x]Affect attention and concentration appear appropriate  [x]Recent and remote memory appears unremarkable  [x]Speech normal without dysarthria or aphasia, comprehension and repetition intact.    COMMENTS:    Cranial Nerves [x]No VF deficit to confrontation,  no papilledema on fundoscopic

## 2018-01-11 LAB
ANA IGG, ELISA: NORMAL
RPR: NORMAL

## 2018-01-12 ENCOUNTER — TELEPHONE (OUTPATIENT)
Dept: NEUROSURGERY | Age: 38
End: 2018-01-12

## 2018-01-12 LAB
ARSENIC BLOOD: <10 UG/L (ref 0–13)
LEAD LEVEL BLOOD: <2 UG/DL (ref 0–4.9)
LYME (B. BURGDORFERI) AB IGG WB: NEGATIVE
LYME AB IGM BY WB:: NEGATIVE
MERCURY BLOOD: <3 UG/L (ref 0–10)

## 2018-02-19 ENCOUNTER — TELEPHONE (OUTPATIENT)
Dept: NEUROSURGERY | Age: 38
End: 2018-02-19

## 2018-02-19 NOTE — TELEPHONE ENCOUNTER
Patient called and was upset that she hadn't heard anything back about her test being scheduled or the PA's done for her insurance. She cancelled her apportionment for tomorrow 2/20/18, due to not having the test done yet and needs a new appointment scheduled.

## 2018-03-14 ENCOUNTER — TELEPHONE (OUTPATIENT)
Dept: NEUROSURGERY | Age: 38
End: 2018-03-14

## 2018-03-15 ENCOUNTER — TELEPHONE (OUTPATIENT)
Dept: NEUROSURGERY | Age: 38
End: 2018-03-15

## 2018-04-05 ENCOUNTER — HOSPITAL ENCOUNTER (OUTPATIENT)
Dept: NEUROLOGY | Age: 38
Discharge: HOME | End: 2018-04-05
Payer: COMMERCIAL

## 2018-04-05 ENCOUNTER — HOSPITAL ENCOUNTER (OUTPATIENT)
Dept: NON INVASIVE DIAGNOSTICS | Age: 38
Discharge: HOME | End: 2018-04-05
Payer: COMMERCIAL

## 2018-04-05 DIAGNOSIS — R53.1 WEAKNESS: ICD-10-CM

## 2018-04-05 DIAGNOSIS — R51.9 HEADACHE, UNSPECIFIED HEADACHE TYPE: ICD-10-CM

## 2018-04-05 DIAGNOSIS — R42 DIZZINESS: ICD-10-CM

## 2018-04-05 DIAGNOSIS — R25.1 TREMOR: ICD-10-CM

## 2018-04-05 LAB
LV EF: 58 %
LVEF MODALITY: NORMAL

## 2018-04-05 PROCEDURE — 95886 MUSC TEST DONE W/N TEST COMP: CPT

## 2018-04-05 PROCEDURE — 95913 NRV CNDJ TEST 13/> STUDIES: CPT | Performed by: PSYCHIATRY & NEUROLOGY

## 2018-04-05 PROCEDURE — 93306 TTE W/DOPPLER COMPLETE: CPT

## 2018-04-05 PROCEDURE — 95913 NRV CNDJ TEST 13/> STUDIES: CPT

## 2018-04-05 PROCEDURE — 95886 MUSC TEST DONE W/N TEST COMP: CPT | Performed by: PSYCHIATRY & NEUROLOGY

## 2018-04-24 ENCOUNTER — OFFICE VISIT (OUTPATIENT)
Dept: NEUROSURGERY | Age: 38
End: 2018-04-24
Payer: COMMERCIAL

## 2018-04-24 VITALS
HEART RATE: 89 BPM | OXYGEN SATURATION: 97 % | DIASTOLIC BLOOD PRESSURE: 78 MMHG | SYSTOLIC BLOOD PRESSURE: 126 MMHG | WEIGHT: 230.6 LBS | BODY MASS INDEX: 43.54 KG/M2 | HEIGHT: 61 IN

## 2018-04-24 DIAGNOSIS — R42 DIZZINESS: ICD-10-CM

## 2018-04-24 DIAGNOSIS — R25.1 TREMOR: Primary | ICD-10-CM

## 2018-04-24 DIAGNOSIS — R53.1 WEAKNESS: ICD-10-CM

## 2018-04-24 DIAGNOSIS — R51.9 HEADACHE, UNSPECIFIED HEADACHE TYPE: ICD-10-CM

## 2018-04-24 PROCEDURE — 99214 OFFICE O/P EST MOD 30 MIN: CPT | Performed by: PSYCHIATRY & NEUROLOGY

## 2018-04-24 RX ORDER — DULOXETIN HYDROCHLORIDE 30 MG/1
30 CAPSULE, DELAYED RELEASE ORAL DAILY
Qty: 7 CAPSULE | Refills: 0 | Status: SHIPPED | OUTPATIENT
Start: 2018-04-24 | End: 2022-03-24 | Stop reason: ALTCHOICE

## 2018-04-24 RX ORDER — DULOXETIN HYDROCHLORIDE 60 MG/1
60 CAPSULE, DELAYED RELEASE ORAL DAILY
Qty: 30 CAPSULE | Refills: 5 | Status: SHIPPED | OUTPATIENT
Start: 2018-04-24 | End: 2022-03-24 | Stop reason: ALTCHOICE

## 2018-04-26 ENCOUNTER — TELEPHONE (OUTPATIENT)
Dept: NEUROLOGY | Age: 38
End: 2018-04-26

## 2020-02-24 ENCOUNTER — TELEPHONE (OUTPATIENT)
Dept: NEUROLOGY | Age: 40
End: 2020-02-24

## 2022-03-24 ENCOUNTER — OFFICE VISIT (OUTPATIENT)
Dept: NEUROSURGERY | Age: 42
End: 2022-03-24
Payer: COMMERCIAL

## 2022-03-24 ENCOUNTER — OFFICE VISIT (OUTPATIENT)
Dept: GASTROENTEROLOGY | Age: 42
End: 2022-03-24
Payer: COMMERCIAL

## 2022-03-24 VITALS
BODY MASS INDEX: 43.2 KG/M2 | OXYGEN SATURATION: 97 % | HEIGHT: 61 IN | WEIGHT: 228.8 LBS | HEART RATE: 75 BPM | SYSTOLIC BLOOD PRESSURE: 118 MMHG | RESPIRATION RATE: 20 BRPM | DIASTOLIC BLOOD PRESSURE: 85 MMHG

## 2022-03-24 VITALS
HEART RATE: 92 BPM | SYSTOLIC BLOOD PRESSURE: 114 MMHG | HEIGHT: 61 IN | DIASTOLIC BLOOD PRESSURE: 62 MMHG | BODY MASS INDEX: 43.39 KG/M2 | WEIGHT: 229.8 LBS | OXYGEN SATURATION: 98 %

## 2022-03-24 DIAGNOSIS — Z11.52 ENCOUNTER FOR SCREENING FOR COVID-19: Primary | ICD-10-CM

## 2022-03-24 DIAGNOSIS — G43.009 MIGRAINE WITHOUT AURA AND WITHOUT STATUS MIGRAINOSUS, NOT INTRACTABLE: ICD-10-CM

## 2022-03-24 DIAGNOSIS — R13.10 DYSPHAGIA, UNSPECIFIED TYPE: Primary | ICD-10-CM

## 2022-03-24 DIAGNOSIS — R20.0 RIGHT SIDED NUMBNESS: ICD-10-CM

## 2022-03-24 DIAGNOSIS — M79.7 FIBROMYALGIA: ICD-10-CM

## 2022-03-24 DIAGNOSIS — R41.3 MEMORY CHANGE: ICD-10-CM

## 2022-03-24 DIAGNOSIS — G43.009 MIGRAINE WITHOUT AURA AND WITHOUT STATUS MIGRAINOSUS, NOT INTRACTABLE: Primary | ICD-10-CM

## 2022-03-24 LAB
ALBUMIN SERPL-MCNC: 5.1 G/DL (ref 3.5–5.2)
ALP BLD-CCNC: 69 U/L (ref 35–104)
ALT SERPL-CCNC: 18 U/L (ref 5–33)
ANION GAP SERPL CALCULATED.3IONS-SCNC: 13 MMOL/L (ref 7–19)
AST SERPL-CCNC: 13 U/L (ref 5–32)
BASOPHILS ABSOLUTE: 0 K/UL (ref 0–0.2)
BASOPHILS RELATIVE PERCENT: 0.2 % (ref 0–1)
BILIRUB SERPL-MCNC: <0.2 MG/DL (ref 0.2–1.2)
BUN BLDV-MCNC: 24 MG/DL (ref 6–20)
C-REACTIVE PROTEIN: <0.3 MG/DL (ref 0–0.5)
CALCIUM SERPL-MCNC: 9.6 MG/DL (ref 8.6–10)
CHLORIDE BLD-SCNC: 104 MMOL/L (ref 98–111)
CO2: 24 MMOL/L (ref 22–29)
CREAT SERPL-MCNC: 0.7 MG/DL (ref 0.5–0.9)
EOSINOPHILS ABSOLUTE: 0 K/UL (ref 0–0.6)
EOSINOPHILS RELATIVE PERCENT: 0.1 % (ref 0–5)
GFR AFRICAN AMERICAN: >59
GFR NON-AFRICAN AMERICAN: >60
GLUCOSE BLD-MCNC: 102 MG/DL (ref 74–109)
HBA1C MFR BLD: 5.2 % (ref 4–6)
HCT VFR BLD CALC: 43.1 % (ref 37–47)
HEMOGLOBIN: 13.6 G/DL (ref 12–16)
HIV-1 P24 AG: NORMAL
IMMATURE GRANULOCYTES #: 0 K/UL
LYMPHOCYTES ABSOLUTE: 1.7 K/UL (ref 1.1–4.5)
LYMPHOCYTES RELATIVE PERCENT: 15.5 % (ref 20–40)
MCH RBC QN AUTO: 30 PG (ref 27–31)
MCHC RBC AUTO-ENTMCNC: 31.6 G/DL (ref 33–37)
MCV RBC AUTO: 94.9 FL (ref 81–99)
MONOCYTES ABSOLUTE: 0.4 K/UL (ref 0–0.9)
MONOCYTES RELATIVE PERCENT: 3.2 % (ref 0–10)
NEUTROPHILS ABSOLUTE: 9 K/UL (ref 1.5–7.5)
NEUTROPHILS RELATIVE PERCENT: 80.6 % (ref 50–65)
PDW BLD-RTO: 12.7 % (ref 11.5–14.5)
PLATELET # BLD: 420 K/UL (ref 130–400)
PMV BLD AUTO: 9.2 FL (ref 9.4–12.3)
POTASSIUM SERPL-SCNC: 4.5 MMOL/L (ref 3.5–5)
RAPID HIV 1&2: NORMAL
RBC # BLD: 4.54 M/UL (ref 4.2–5.4)
RHEUMATOID FACTOR: <10 IU/ML
SEDIMENTATION RATE, ERYTHROCYTE: 14 MM/HR (ref 0–20)
SODIUM BLD-SCNC: 141 MMOL/L (ref 136–145)
TOTAL PROTEIN: 7.9 G/DL (ref 6.6–8.7)
TSH REFLEX FT4: 0.27 UIU/ML (ref 0.35–5.5)
VITAMIN B-12: 549 PG/ML (ref 211–946)
WBC # BLD: 11.1 K/UL (ref 4.8–10.8)

## 2022-03-24 PROCEDURE — 99204 OFFICE O/P NEW MOD 45 MIN: CPT | Performed by: NURSE PRACTITIONER

## 2022-03-24 RX ORDER — AMITRIPTYLINE HYDROCHLORIDE 25 MG/1
25 TABLET, FILM COATED ORAL DAILY
Qty: 30 TABLET | Refills: 2 | Status: SHIPPED | OUTPATIENT
Start: 2022-03-24

## 2022-03-24 RX ORDER — UBROGEPANT 100 MG/1
TABLET ORAL
Qty: 10 TABLET | Refills: 3 | Status: SHIPPED | OUTPATIENT
Start: 2022-03-24 | End: 2022-05-31 | Stop reason: ALTCHOICE

## 2022-03-24 RX ORDER — AMITRIPTYLINE HYDROCHLORIDE 10 MG/1
TABLET, FILM COATED ORAL DAILY
COMMUNITY
End: 2022-03-24 | Stop reason: SDUPTHER

## 2022-03-24 RX ORDER — PREDNISONE 20 MG/1
TABLET ORAL DAILY
Status: ON HOLD | COMMUNITY
Start: 2022-03-21 | End: 2022-04-25 | Stop reason: ALTCHOICE

## 2022-03-24 RX ORDER — AZITHROMYCIN 500 MG/1
TABLET, FILM COATED ORAL DAILY
Status: ON HOLD | COMMUNITY
Start: 2022-03-21 | End: 2022-04-25 | Stop reason: ALTCHOICE

## 2022-03-24 ASSESSMENT — ENCOUNTER SYMPTOMS
COUGH: 0
SHORTNESS OF BREATH: 0
VOMITING: 0
BLOOD IN STOOL: 0
CHOKING: 1
NAUSEA: 0
DIARRHEA: 0
RECTAL PAIN: 0
ANAL BLEEDING: 0
TROUBLE SWALLOWING: 1
ABDOMINAL DISTENTION: 0
ABDOMINAL PAIN: 0
CONSTIPATION: 0

## 2022-03-24 NOTE — PROGRESS NOTES
[facility] Neurology Office Note      Patient:   Valentine Bergeron  MR#:    [id number]  Account Number:                         YOB: 1980  Date of Evaluation:  3/24/2022  Time of Note:                          1:32 PM  Primary/Referring Physician:  DAVID Pacheco - CNP   Consulting Physician:  Dewayne Rocha, ED, APRN    NEW PATIENT CONSULTATION    Chief Complaint   Patient presents with    New Patient     Slurred Speach/Choking - states has it a lot and saw GI today and going to have an Endo - previously had to have esophagus streched    Migraine     Patient states that she had a spell a couple of weeks ago with a migraine and her right side of her body went numb    Memory Loss     \"Brain Fog\" worse after Trinity Bose is a 46y.o. year old female here for evaluation of fibromyalgia, headaches, numbness. She was previously seen by Dr. Arminda Burciaga for similar complaints. She was diagnosed with fibromyalgia at [facility] several years ago now. She is currently noting slurred speech, right sided numbness, choking, brain fog, migraines. She had a recent spell where she had sudden onset of right sided numbness involving the face, arm and leg. Noted weakness as well. Notes intermittent muscle spasms over the right side as well. Symptoms lasted about an hour and then resolved. She did correlate a headache with this episode. Headache pain is retro orbital with radiation globally. She notes nausea, light/sound sensitivity with headaches. She notes intermittent speech and vision changes with headaches. She takes Tylenol with improvement. No prior migraine abortive medications. She is on Amitriptyline for about a year now, fairly low dose, no change in headache frequency. She has episodic left sided numbness as well. Does not always correlate headaches and numbness together. She has noted more memory concerns as well, more short term in nature.  Not interfering with ADLs. She is working no issues with this, noting stress with job. Has noted more choking episodes recently. Seeing GI here, will be getting an EGD. Prior MRI brain was normal.      Past Medical History:   Diagnosis Date    Fibromyalgia     Migraines        Past Surgical History:   Procedure Laterality Date     SECTION  ,      SECTION      x3    CHOLECYSTECTOMY  2009    ENDOMETRIAL ABLATION      LEG SURGERY  1990    right, mass, normal    LEG SURGERY      TUBAL LIGATION      UPPER GASTROINTESTINAL ENDOSCOPY      [place] per patient     WISDOM TOOTH EXTRACTION Bilateral        Family History   Problem Relation Age of Onset    High Blood Pressure Mother     Celiac Disease Mother     High Cholesterol Mother     Diabetes Maternal Grandmother     Stroke Maternal Grandmother     Heart Attack Maternal Grandfather     Cancer Paternal Grandfather     Colon Cancer Neg Hx     Esophageal Cancer Neg Hx     Liver Cancer Neg Hx     Rectal Cancer Neg Hx     Stomach Cancer Neg Hx        Social History     Socioeconomic History    Marital status:      Spouse name: Not on file    Number of children: Not on file    Years of education: Not on file    Highest education level: Not on file   Occupational History    Not on file   Tobacco Use    Smoking status: Never Smoker    Smokeless tobacco: Never Used   Vaping Use    Vaping Use: Never used   Substance and Sexual Activity    Alcohol use: No    Drug use: No    Sexual activity: Yes     Partners: Male   Other Topics Concern    Not on file   Social History Narrative    Not on file     Social Determinants of Health     Financial Resource Strain:     Difficulty of Paying Living Expenses: Not on file   Food Insecurity:     Worried About Running Out of Food in the Last Year: Not on file    Maura of Food in the Last Year: Not on file   Transportation Needs:     Lack of Transportation (Medical):  Not on file  Lack of Transportation (Non-Medical): Not on file   Physical Activity:     Days of Exercise per Week: Not on file    Minutes of Exercise per Session: Not on file   Stress:     Feeling of Stress : Not on file   Social Connections:     Frequency of Communication with Friends and Family: Not on file    Frequency of Social Gatherings with Friends and Family: Not on file    Attends Muslim Services: Not on file    Active Member of [address] [place] [address] or Organizations: Not on file    Attends Club or Organization Meetings: Not on file    Marital Status: Not on file   Intimate Partner Violence:     Fear of Current or Ex-Partner: Not on file    Emotionally Abused: Not on file    Physically Abused: Not on file    Sexually Abused: Not on file   Housing Stability:     Unable to Pay for Housing in the Last Year: Not on file    Number of [place] in the Last Year: Not on file    Unstable Housing in the Last Year: Not on file       Current Outpatient Medications   Medication Sig Dispense Refill    predniSONE (DELTASONE) 20 MG tablet daily      azithromycin (ZITHROMAX) 500 MG tablet daily      amitriptyline (ELAVIL) 25 MG tablet Take 1 tablet by mouth daily 30 tablet 2    Ubrogepant (UBRELVY) 100 MG TABS Take 1 tablet at the onset of migraine. May repeat once in 2 hours if no improvement. Do not exceed 2 tablets in 24 hours. 10 tablet 3    loratadine-pseudoephedrine (CLARITIN-D 24 HOUR)  MG per extended release tablet Take 1 tablet by mouth daily as needed (allergies and congestion) 20 tablet 2     No current facility-administered medications for this visit. No Known Allergies      REVIEW OF SYSTEMS  Constitutional: []? Fever []? Sweat []? Chills []? Recent Injury [x]? Denies all unless marked  HEENT:[]? Headache  []? Head Injury/Hearing Loss  []? Sore Throat  []? Ear Ache/Dizziness  [x]? Denies all unless marked  Spine:  []? Neck pain  []? Back pain  []? Sciaticia  [x]?  Denies all unless marked  Cardiovascular:[]? Heart Disease []? Chest Pain [x]? Palpitations  []? Denies all unless marked  Pulmonary: []? Shortness of Breath []? Cough   [x]? Denies all unless marke  Gastrointestinal: []? Nausea  []? Vomiting  []? Abdominal Pain  []? Constipation  []? Diarrhea  []? Dark Bloody Stools  [x]? Denies all unless marked  Psychiatric/Behavioral:[]? Depression []? Anxiety [x]? Denies all unless marked  Genitourinary:   []? Frequency  []? Urgency  []? Incontinence []? Pain with Urination  [x]? Denies all unless marked  Extremities: []? Pain  []? Swelling  [x]? Denies all unless marked  Musculoskeletal: []? Muscle Pain  []? Joint Pain  []? Arthritis []? Muscle Cramps []? Muscle Twitches  [x]? Denies all unless marked  Sleep: []? Insomnia []? Snoring []? Restless Legs []? Sleep Apnea  []? Daytime Sleepiness  [x]? Denies all unless marked  Skin:[]? Rash []? Skin Discoloration [x]? Denies all unless marked   Neurological: []? Visual Disturbance/Memory Loss []? Loss of Balance []? Slurred Speech/Weakness []? Seizures  []? Vertigo/Dizziness [x]? Denies all unless marked    The MA has completed the ROS with the patient. I have reviewed it in its' entirety with the patient and agree with the documentation. PHYSICAL EXAM  /85   Pulse 75   Resp 20   Ht 5' 1\" (1.549 m)   Wt 228 lb 12.8 oz (103.8 kg)   SpO2 97%   BMI 43.23 kg/m²       Constitutional - No acute distress    HEENT- Conjunctiva normal.  No scars, masses, or lesions over external nose or ears, no neck masses noted, no jugular vein distension, no bruit  Cardiac- Regular rate and rhythm  Pulmonary- Good expansion, normal effort without use of accessory muscles  Musculoskeletal - No significant wasting of muscles noted, no bony deformities  Extremities - No clubbing, cyanosis or edema  Skin - Warm, dry, and intact.   No rash, erythema, or pallor  Psychiatric - Mood, affect, and behavior appear normal      NEUROLOGICAL EXAM     Mental status   [x] Awake, alert, oriented   [x]Affect attention and concentration appear appropriate  [x]Recent and remote memory appears unremarkable  [x]Speech normal without dysarthria or aphasia, comprehension and repetition intact. COMMENTS:    Cranial Nerves [x]No VF deficit to confrontation,  no papilledema on fundoscopic exam.  [x]PERRLA, EOMI, no nystagmus, conjugate eye movements, no ptosis  [x]Face symmetric  [x]Facial sensation intact  [x]Tongue midline no atrophy or fasciculations present  [x]Palate midline, hearing to finger rub normal bilaterally  [x]Shoulder shrug and SCM testing normal bilaterally  COMMENTS:   Motor   [x]5/5 strength x 4 extremities  [x]Normal bulk and tone  [x]No tremor present  [x]No rigidity or bradykinesia noted  COMMENTS:   Sensory  [x]Sensation intact to light touch, pin prick, vibration, and proprioception BLE  [x]Sensation intact to light touch, pin prick, vibration, and proprioception BUE  COMMENTS:   Coordination [x]FTN normal bilaterally   [x]HTS normal bilaterally  [x]LATOYA normal bilaterally.    COMMENTS:   Reflexes  [x]Symmetric and non-pathological  [x]Toes down going bilaterally  [x]No clonus present  COMMENTS:   Gait                  [x]Normal steady gait    []Ataxic    []Spastic     []Magnetic     []Shuffling  COMMENTS:       LABS RECORD AND IMAGING REVIEW (As below and per HPI)    Lab Results   Component Value Date    NKEWMPGH14 543 01/09/2018     Lab Results   Component Value Date    WBC 6.8 01/09/2018    HGB 12.9 01/09/2018    HCT 38.6 01/09/2018    MCV 91.3 01/09/2018     01/09/2018     Lab Results   Component Value Date     01/09/2018    K 3.9 01/09/2018     01/09/2018    CO2 20 (L) 01/09/2018    BUN 17 01/09/2018    CREATININE 0.6 01/09/2018    GLUCOSE 64 (L) 01/09/2018    CALCIUM 9.1 01/09/2018    PROT 7.7 01/09/2018    LABALBU 4.8 01/09/2018    BILITOT <0.2 01/09/2018    ALKPHOS 63 01/09/2018    AST 18 01/09/2018    ALT 21 01/09/2018    LABGLOM >60 01/09/2018     No results found for: CHOL, TRIG, HDL, LDLCALC  Lab Results   Component Value Date    TSH 0.922 01/09/2018    T4FREE 1.0 01/09/2018     Lab Results   Component Value Date    CRP 0.36 01/09/2018    SEDRATE 16 01/09/2018      Reviewed referral records, reviewed prior neurology records    ASSESSMENT:    Nishant Simons is a 46y.o. year old female here for evaluation of headaches, numbness, choking, memory changes, history of fibromyalgia. Exam today is non focal. Prior work up has been unremarkable as well. Question if recent episode of headache and numbness was a complicated migraine variant. Stroke or TIA felt less likely. Has had prior imaging that was negative, MS felt less likely. Will plan to repeat MRI brain, MRA head and labs to exclude secondary source. Will increase Amitriptyline today and add Ubrelvy prn. Fibromyalgia likely playing a role in memory concerns along with stress. Following with GI for episodes of choking. ICD-10-CM    1. Migraine without aura and without status migrainosus, not intractable  G43.009 MRI BRAIN W WO CONTRAST     MRA HEAD WO CONTRAST     CBC with Auto Differential     MADDIE Screen with Reflex     Comprehensive Metabolic Panel     C-Reactive Protein     Hemoglobin A1C     HIV Screen     Vitamin B12     TSH with Reflex to FT4     Sedimentation Rate     Rheumatoid Factor     Electrophoresis Protein, Serum   2. Right sided numbness  R20.0    3. Memory change  R41.3      PLAN:  1. MRI brain   2. MRA head   3. Labs as listed above   4. Increase Amitriptyline to 25mg nightly. Discussed side effects with patient. 5. Ubrelvy prn. Discussed side effects with patient. 6. Return in about 3 months (around 6/24/2022) for follow up, sooner if worsening.     Leandro Steward DNP, APRN

## 2022-03-24 NOTE — PROGRESS NOTES
Subjective:     Patient ID: Natali Smith is a 46 y.o. female  PCP: DAVID Evans - CNP  Referring Provider: CANDIDA Combs    HPI  Patient presents to the office today with the following complaints: New Patient      Pt referred for c/o choking. Hx EGD with dilation in the past.  She reports symptoms are chronic. Hx fibromyalgia. She states during flares of fibromyalgia, swallowing and choking are worse. Pt c/o choking and foods getting stuck. Sometimes it's in throat other times lower in chest.  She will have vomiting with this. Meat, potatoes and bread are worse. Sometimes it will occur with liquids as well. She does admit to reflux at times. She has been on PPI in the past, not currently taking anything. \"I am not crazy about taking medicines. \"       Last EGD 2020 - dilation per patient (in [place])   Never had a colonoscopy  Denies any family hx colon cancer or colon polyps    Assessment:     1. Dysphagia, unspecified type    2. Fibromyalgia            Plan:   - Follow up in 6-8 weeks or sooner if needed for procedure follow up  - Schedule EGD  Nothing to eat or drink after midnight. No driving for 24 hours after procedure. Bring a  to procedure. No aspirin, NSAIDs, fish oil 5 days before procedure. I have discussed the benefits, alternatives, and risks (including bleeding, perforation and death)  for pursuing Endoscopy (EGD/Colonscopy/EUS/ERCP) with the patient and they are willing to continue. We also discussed the need for anesthesia, IV access, proper dietary changes, medication changes if necessary, and need for bowel prep (if ordered) prior to their Endoscopic procedure. They are aware they must have someone accompany them to their scheduled procedure to drive them home - they agree to the above and are willing to continue. Orders  No orders of the defined types were placed in this encounter.     Medications  No orders of the defined types were placed in this encounter. Patient History:     History reviewed. No pertinent past medical history. Past Surgical History:   Procedure Laterality Date     SECTION  ,      SECTION      x3    CHOLECYSTECTOMY  2009    LEG SURGERY  1990    right, mass, normal    LEG SURGERY      TUBAL LIGATION      UPPER GASTROINTESTINAL ENDOSCOPY  2020, [facility] per patient     WISDOM TOOTH EXTRACTION Bilateral        Family History   Problem Relation Age of Onset    High Blood Pressure Mother     Celiac Disease Mother     High Cholesterol Mother     Diabetes Maternal Grandmother     Stroke Maternal Grandmother     Heart Attack Maternal Grandfather     Cancer Paternal Grandfather     Colon Cancer Neg Hx     Esophageal Cancer Neg Hx     Liver Cancer Neg Hx     Rectal Cancer Neg Hx     Stomach Cancer Neg Hx        Social History     Socioeconomic History    Marital status:      Spouse name: None    Number of children: None    Years of education: None    Highest education level: None   Occupational History    None   Tobacco Use    Smoking status: Never Smoker    Smokeless tobacco: Never Used   Vaping Use    Vaping Use: Never used   Substance and Sexual Activity    Alcohol use: No    Drug use: No    Sexual activity: Yes     Partners: Male   Other Topics Concern    None   Social History Narrative    None     Social Determinants of Health     Financial Resource Strain:     Difficulty of Paying Living Expenses: Not on file   Food Insecurity:     Worried About Running Out of Food in the Last Year: Not on file    Maura of Food in the Last Year: Not on file   Transportation Needs:     Lack of Transportation (Medical): Not on file    Lack of Transportation (Non-Medical):  Not on file   Physical Activity:     Days of Exercise per Week: Not on file    Minutes of Exercise per Session: Not on file   Stress:     Feeling of Stress : Not on file   Social Connections:     Frequency of Communication with Friends and Family: Not on file    Frequency of Social Gatherings with Friends and Family: Not on file    Attends Rastafari Services: Not on file    Active Member of Clubs or Organizations: Not on file    Attends Club or Organization Meetings: Not on file    Marital Status: Not on file   Intimate Partner Violence:     Fear of Current or Ex-Partner: Not on file    Emotionally Abused: Not on file    Physically Abused: Not on file    Sexually Abused: Not on file   Housing Stability:     Unable to Pay for Housing in the Last Year: Not on file    Number of [place] in the Last Year: Not on file    Unstable Housing in the Last Year: Not on file       Current Outpatient Medications   Medication Sig Dispense Refill    amitriptyline (ELAVIL) 10 MG tablet Take by mouth daily      predniSONE (DELTASONE) 20 MG tablet daily      azithromycin (ZITHROMAX) 500 MG tablet daily      loratadine-pseudoephedrine (CLARITIN-D 24 HOUR)  MG per extended release tablet Take 1 tablet by mouth daily as needed (allergies and congestion) 20 tablet 2     No current facility-administered medications for this visit. No Known Allergies    Review of Systems   Constitutional: Negative for activity change, appetite change, fatigue, fever and unexpected weight change. HENT: Positive for trouble swallowing. Respiratory: Positive for choking. Negative for cough and shortness of breath. Cardiovascular: Negative for chest pain. Gastrointestinal: Negative for abdominal distention, abdominal pain, anal bleeding, blood in stool, constipation, diarrhea, nausea, rectal pain and vomiting. Reflux    Allergic/Immunologic: Negative for food allergies. All other systems reviewed and are negative. Objective:     /62   Pulse 92   Ht 5' 1\" (1.549 m)   Wt 229 lb 12.8 oz (104.2 kg)   SpO2 98%   BMI 43.42 kg/m²     Physical Exam  Vitals reviewed. Constitutional:       General: She is not in acute distress. Appearance: She is well-developed. HENT:      Head: Normocephalic and atraumatic. Right Ear: External ear normal.      Left Ear: External ear normal.      Nose: Nose normal.      Comments: Mask on     Mouth/Throat:      Comments: Mask on  Eyes:      Conjunctiva/sclera: Conjunctivae normal.      Pupils: Pupils are equal, round, and reactive to light. Cardiovascular:      Rate and Rhythm: Normal rate and regular rhythm. Heart sounds: Normal heart sounds. No murmur heard. No friction rub. No gallop. Pulmonary:      Effort: Pulmonary effort is normal. No respiratory distress. Breath sounds: Normal breath sounds. Abdominal:      General: Bowel sounds are normal. There is no distension. Palpations: Abdomen is soft. There is no mass. Tenderness: There is abdominal tenderness in the epigastric area. There is no guarding or rebound. Musculoskeletal:         General: Normal range of motion. Cervical back: Normal range of motion and neck supple. Skin:     General: Skin is warm and dry. Findings: No rash. Nails: There is no clubbing. Neurological:      Mental Status: She is alert and oriented to person, place, and time. Gait: Gait normal.   Psychiatric:         Behavior: Behavior normal.         Thought Content:  Thought content normal.

## 2022-03-24 NOTE — PROGRESS NOTES
REVIEW OF SYSTEMS    Constitutional: []Fever []Sweat []Chills [] Recent Injury [x] Denies all unless marked  HEENT:[]Headache  [] Head Injury/Hearing Loss  [] Sore Throat  [] Ear Ache/Dizziness  [x] Denies all unless marked  Spine:  [] Neck pain  [] Back pain  [] Sciaticia  [x] Denies all unless marked  Cardiovascular:[]Heart Disease []Chest Pain [x] Palpitations  [] Denies all unless marked  Pulmonary: []Shortness of Breath []Cough   [x] Denies all unless marke  Gastrointestinal: []Nausea  []Vomiting  []Abdominal Pain  []Constipation  []Diarrhea  []Dark Bloody Stools  [x] Denies all unless marked  Psychiatric/Behavioral:[] Depression [] Anxiety [x] Denies all unless marked  Genitourinary:   [] Frequency  [] Urgency  [] Incontinence [] Pain with Urination  [x] Denies all unless marked  Extremities: []Pain  []Swelling  [x] Denies all unless marked  Musculoskeletal: [] Muscle Pain  [] Joint Pain  [] Arthritis [] Muscle Cramps [] Muscle Twitches  [x] Denies all unless marked  Sleep: [] Insomnia [] Snoring [] Restless Legs [] Sleep Apnea  [] Daytime Sleepiness  [x] Denies all unless marked  Skin:[] Rash [] Skin Discoloration [x] Denies all unless marked   Neurological: []Visual Disturbance/Memory Loss [] Loss of Balance [] Slurred Speech/Weakness [] Seizures  [] Vertigo/Dizziness [x] Denies all unless marked

## 2022-03-27 LAB — ANA IGG, ELISA: NORMAL

## 2022-03-28 LAB
ALBUMIN SERPL-MCNC: 4.31 G/DL (ref 3.75–5.01)
ALPHA-1-GLOBULIN: 0.37 G/DL (ref 0.19–0.46)
ALPHA-2-GLOBULIN: 1 G/DL (ref 0.48–1.05)
BETA GLOBULIN: 0.98 G/DL (ref 0.48–1.1)
GAMMA GLOBULIN: 1.13 G/DL (ref 0.62–1.51)
PROTEIN ELECTROPHORESIS, SERUM: NORMAL
SPE/IFE INTERPRETATION: NORMAL
TOTAL PROTEIN: 7.8 G/DL (ref 6.3–8.2)

## 2022-04-04 ENCOUNTER — TELEPHONE (OUTPATIENT)
Dept: NEUROLOGY | Age: 42
End: 2022-04-04

## 2022-04-04 NOTE — TELEPHONE ENCOUNTER
Luc Lubin from [facility] 2 department left message stating that the MRA has been denied.  Any questions call Luc Lubin at [phone]

## 2022-04-08 ENCOUNTER — APPOINTMENT (OUTPATIENT)
Dept: MRI IMAGING | Age: 42
End: 2022-04-08
Payer: COMMERCIAL

## 2022-04-08 ENCOUNTER — HOSPITAL ENCOUNTER (OUTPATIENT)
Dept: MRI IMAGING | Age: 42
Discharge: HOME | End: 2022-04-08
Payer: COMMERCIAL

## 2022-04-08 DIAGNOSIS — G43.009 MIGRAINE WITHOUT AURA AND WITHOUT STATUS MIGRAINOSUS, NOT INTRACTABLE: ICD-10-CM

## 2022-04-08 PROCEDURE — 70553 MRI BRAIN STEM W/O & W/DYE: CPT

## 2022-04-08 PROCEDURE — A9577 INJ MULTIHANCE: HCPCS | Performed by: NURSE PRACTITIONER

## 2022-04-08 PROCEDURE — 6360000004 HC RX CONTRAST MEDICATION: Performed by: NURSE PRACTITIONER

## 2022-04-08 RX ORDER — FLUTICASONE PROPIONATE 50 MCG
1 SPRAY, SUSPENSION (ML) NASAL DAILY
Qty: 16 G | Refills: 0 | Status: ON HOLD | OUTPATIENT
Start: 2022-04-08 | End: 2022-04-25 | Stop reason: ALTCHOICE

## 2022-04-08 RX ADMIN — GADOBENATE DIMEGLUMINE 20 ML: 529 INJECTION, SOLUTION INTRAVENOUS at 08:32

## 2022-04-25 ENCOUNTER — HOSPITAL ENCOUNTER (OUTPATIENT)
Age: 42
Setting detail: OUTPATIENT SURGERY
Discharge: HOME | End: 2022-04-25
Attending: INTERNAL MEDICINE | Admitting: INTERNAL MEDICINE
Payer: COMMERCIAL

## 2022-04-25 ENCOUNTER — ANESTHESIA (OUTPATIENT)
Dept: ENDOSCOPY | Age: 42
End: 2022-04-25
Payer: COMMERCIAL

## 2022-04-25 ENCOUNTER — ANESTHESIA EVENT (OUTPATIENT)
Dept: ENDOSCOPY | Age: 42
End: 2022-04-25
Payer: COMMERCIAL

## 2022-04-25 ENCOUNTER — TELEPHONE (OUTPATIENT)
Dept: GASTROENTEROLOGY | Age: 42
End: 2022-04-25

## 2022-04-25 VITALS — TEMPERATURE: 97 F | DIASTOLIC BLOOD PRESSURE: 59 MMHG | SYSTOLIC BLOOD PRESSURE: 118 MMHG | OXYGEN SATURATION: 98 %

## 2022-04-25 VITALS
SYSTOLIC BLOOD PRESSURE: 108 MMHG | RESPIRATION RATE: 16 BRPM | BODY MASS INDEX: 43.05 KG/M2 | WEIGHT: 228 LBS | TEMPERATURE: 98 F | DIASTOLIC BLOOD PRESSURE: 84 MMHG | HEART RATE: 81 BPM | OXYGEN SATURATION: 100 % | HEIGHT: 61 IN

## 2022-04-25 LAB — HCG(URINE) PREGNANCY TEST: NEGATIVE

## 2022-04-25 PROCEDURE — 2580000003 HC RX 258: Performed by: INTERNAL MEDICINE

## 2022-04-25 PROCEDURE — 2500000003 HC RX 250 WO HCPCS: Performed by: NURSE ANESTHETIST, CERTIFIED REGISTERED

## 2022-04-25 PROCEDURE — 88305 TISSUE EXAM BY PATHOLOGIST: CPT

## 2022-04-25 PROCEDURE — 3609012400 HC EGD TRANSORAL BIOPSY SINGLE/MULTIPLE: Performed by: INTERNAL MEDICINE

## 2022-04-25 PROCEDURE — 2580000003 HC RX 258: Performed by: NURSE ANESTHETIST, CERTIFIED REGISTERED

## 2022-04-25 PROCEDURE — 3700000000 HC ANESTHESIA ATTENDED CARE: Performed by: INTERNAL MEDICINE

## 2022-04-25 PROCEDURE — 6360000002 HC RX W HCPCS: Performed by: NURSE ANESTHETIST, CERTIFIED REGISTERED

## 2022-04-25 PROCEDURE — 7100000010 HC PHASE II RECOVERY - FIRST 15 MIN: Performed by: INTERNAL MEDICINE

## 2022-04-25 PROCEDURE — 2709999900 HC NON-CHARGEABLE SUPPLY: Performed by: INTERNAL MEDICINE

## 2022-04-25 PROCEDURE — 6370000000 HC RX 637 (ALT 250 FOR IP): Performed by: INTERNAL MEDICINE

## 2022-04-25 PROCEDURE — 43239 EGD BIOPSY SINGLE/MULTIPLE: CPT | Performed by: INTERNAL MEDICINE

## 2022-04-25 PROCEDURE — 3609017700 HC EGD DILATION GASTRIC/DUODENAL STRICTURE: Performed by: INTERNAL MEDICINE

## 2022-04-25 PROCEDURE — 43450 DILATE ESOPHAGUS 1/MULT PASS: CPT | Performed by: INTERNAL MEDICINE

## 2022-04-25 PROCEDURE — 7100000011 HC PHASE II RECOVERY - ADDTL 15 MIN: Performed by: INTERNAL MEDICINE

## 2022-04-25 PROCEDURE — 84703 CHORIONIC GONADOTROPIN ASSAY: CPT

## 2022-04-25 RX ORDER — HYDROMORPHONE HYDROCHLORIDE 1 MG/ML
0.5 INJECTION, SOLUTION INTRAMUSCULAR; INTRAVENOUS; SUBCUTANEOUS EVERY 5 MIN PRN
Status: DISCONTINUED | OUTPATIENT
Start: 2022-04-25 | End: 2022-04-25 | Stop reason: HOSPADM

## 2022-04-25 RX ORDER — SODIUM CHLORIDE 9 MG/ML
INJECTION, SOLUTION INTRAVENOUS PRN
Status: DISCONTINUED | OUTPATIENT
Start: 2022-04-25 | End: 2022-04-25 | Stop reason: HOSPADM

## 2022-04-25 RX ORDER — LIDOCAINE HYDROCHLORIDE 10 MG/ML
INJECTION, SOLUTION INFILTRATION; PERINEURAL PRN
Status: DISCONTINUED | OUTPATIENT
Start: 2022-04-25 | End: 2022-04-25 | Stop reason: SDUPTHER

## 2022-04-25 RX ORDER — SODIUM CHLORIDE 0.9 % (FLUSH) 0.9 %
5-40 SYRINGE (ML) INJECTION EVERY 12 HOURS SCHEDULED
Status: DISCONTINUED | OUTPATIENT
Start: 2022-04-25 | End: 2022-04-25 | Stop reason: HOSPADM

## 2022-04-25 RX ORDER — PROPOFOL 10 MG/ML
INJECTION, EMULSION INTRAVENOUS PRN
Status: DISCONTINUED | OUTPATIENT
Start: 2022-04-25 | End: 2022-04-25 | Stop reason: SDUPTHER

## 2022-04-25 RX ORDER — DIPHENHYDRAMINE HYDROCHLORIDE 50 MG/ML
12.5 INJECTION INTRAMUSCULAR; INTRAVENOUS
Status: DISCONTINUED | OUTPATIENT
Start: 2022-04-25 | End: 2022-04-25 | Stop reason: HOSPADM

## 2022-04-25 RX ORDER — SODIUM CHLORIDE 0.9 % (FLUSH) 0.9 %
5-40 SYRINGE (ML) INJECTION PRN
Status: DISCONTINUED | OUTPATIENT
Start: 2022-04-25 | End: 2022-04-25 | Stop reason: HOSPADM

## 2022-04-25 RX ORDER — SODIUM CHLORIDE, SODIUM LACTATE, POTASSIUM CHLORIDE, CALCIUM CHLORIDE 600; 310; 30; 20 MG/100ML; MG/100ML; MG/100ML; MG/100ML
INJECTION, SOLUTION INTRAVENOUS CONTINUOUS
Status: DISCONTINUED | OUTPATIENT
Start: 2022-04-25 | End: 2022-04-25 | Stop reason: HOSPADM

## 2022-04-25 RX ORDER — SODIUM CHLORIDE, SODIUM LACTATE, POTASSIUM CHLORIDE, CALCIUM CHLORIDE 600; 310; 30; 20 MG/100ML; MG/100ML; MG/100ML; MG/100ML
INJECTION, SOLUTION INTRAVENOUS CONTINUOUS PRN
Status: DISCONTINUED | OUTPATIENT
Start: 2022-04-25 | End: 2022-04-25 | Stop reason: SDUPTHER

## 2022-04-25 RX ORDER — METOCLOPRAMIDE HYDROCHLORIDE 5 MG/ML
10 INJECTION INTRAMUSCULAR; INTRAVENOUS
Status: DISCONTINUED | OUTPATIENT
Start: 2022-04-25 | End: 2022-04-25 | Stop reason: HOSPADM

## 2022-04-25 RX ORDER — HYDROMORPHONE HYDROCHLORIDE 1 MG/ML
0.25 INJECTION, SOLUTION INTRAMUSCULAR; INTRAVENOUS; SUBCUTANEOUS EVERY 5 MIN PRN
Status: DISCONTINUED | OUTPATIENT
Start: 2022-04-25 | End: 2022-04-25 | Stop reason: HOSPADM

## 2022-04-25 RX ADMIN — LIDOCAINE HYDROCHLORIDE 50 MG: 10 INJECTION, SOLUTION INFILTRATION; PERINEURAL at 11:34

## 2022-04-25 RX ADMIN — SODIUM CHLORIDE, POTASSIUM CHLORIDE, SODIUM LACTATE AND CALCIUM CHLORIDE: 600; 310; 30; 20 INJECTION, SOLUTION INTRAVENOUS at 09:21

## 2022-04-25 RX ADMIN — SODIUM CHLORIDE, POTASSIUM CHLORIDE, SODIUM LACTATE AND CALCIUM CHLORIDE: 600; 310; 30; 20 INJECTION, SOLUTION INTRAVENOUS at 11:35

## 2022-04-25 RX ADMIN — PROPOFOL 350 MG: 10 INJECTION, EMULSION INTRAVENOUS at 11:35

## 2022-04-25 RX ADMIN — Medication: at 12:08

## 2022-04-25 ASSESSMENT — PAIN SCALES - GENERAL
PAINLEVEL_OUTOF10: 5
PAINLEVEL_OUTOF10: 5
PAINLEVEL_OUTOF10: 0
PAINLEVEL_OUTOF10: 0

## 2022-04-25 ASSESSMENT — PAIN - FUNCTIONAL ASSESSMENT: PAIN_FUNCTIONAL_ASSESSMENT: 0-10

## 2022-04-25 NOTE — ANESTHESIA PRE PROCEDURE
Department of Anesthesiology  Preprocedure Note       Name:  Jenniffer Henley   Age:  46 y.o.  :  1980                                          MRN:  [id number]         Date:  2022      Surgeon: Olivia Lopez):  April Lopez MD    Procedure: Procedure(s):  EGD BIOPSY    Medications prior to admission:   Prior to Admission medications    Medication Sig Start Date End Date Taking? Authorizing Provider   amitriptyline (ELAVIL) 25 MG tablet Take 1 tablet by mouth daily 3/24/22   DAVID Wilkinson   Ubrogepant (UBRELVY) 100 MG TABS Take 1 tablet at the onset of migraine. May repeat once in 2 hours if no improvement. Do not exceed 2 tablets in 24 hours. Patient not taking: Reported on 2022 3/24/22   DAVID Wilkinson       Current medications:    Current Facility-Administered Medications   Medication Dose Route Frequency Provider Last Rate Last Admin    lactated ringers infusion   IntraVENous Continuous April Lopez  mL/hr at 22 8176 New Bag at 22 3642       Allergies:  No Known Allergies    Problem List:    Patient Active Problem List   Diagnosis Code    Weakness R53.1       Past Medical History:        Diagnosis Date    Dysphagia     Fibromyalgia     Migraines        Past Surgical History:        Procedure Laterality Date     SECTION  ,      SECTION      x3    CHOLECYSTECTOMY  2009    ENDOMETRIAL ABLATION      ESOPHAGEAL DILATATION      LEG SURGERY  1990    right, mass, normal    LEG SURGERY      TUBAL LIGATION  2013    UPPER GASTROINTESTINAL ENDOSCOPY  2020    [place], Dilation per patient     WISDOM TOOTH EXTRACTION Bilateral        Social History:    Social History     Tobacco Use    Smoking status: Never Smoker    Smokeless tobacco: Never Used   Substance Use Topics    Alcohol use:  No                                Counseling given: Not Answered      Vital Signs (Current):   Vitals:    22 0904   BP: 131/76   Pulse: 80   Resp: 16   Temp: 97.8 °F (36.6 °C)   TempSrc: Temporal   SpO2: 100%   Weight: 228 lb (103.4 kg)   Height: 5' 1\" (1.549 m)                                              BP Readings from Last 3 Encounters:   04/25/22 131/76   03/24/22 118/85   03/24/22 114/62       NPO Status: Time of last liquid consumption: 2300                        Time of last solid consumption: 2000                        Date of last liquid consumption: 04/24/22                        Date of last solid food consumption: 04/24/22    BMI:   Wt Readings from Last 3 Encounters:   04/25/22 228 lb (103.4 kg)   03/24/22 228 lb 12.8 oz (103.8 kg)   03/24/22 229 lb 12.8 oz (104.2 kg)     Body mass index is 43.08 kg/m². CBC:   Lab Results   Component Value Date    WBC 11.1 03/24/2022    RBC 4.54 03/24/2022    HGB 13.6 03/24/2022    HCT 43.1 03/24/2022    MCV 94.9 03/24/2022    RDW 12.7 03/24/2022     03/24/2022       CMP:   Lab Results   Component Value Date     03/24/2022    K 4.5 03/24/2022     03/24/2022    CO2 24 03/24/2022    BUN 24 03/24/2022    CREATININE 0.7 03/24/2022    GFRAA >59 03/24/2022    LABGLOM >60 03/24/2022    GLUCOSE 102 03/24/2022    PROT 7.9 03/24/2022    PROT 7.8 03/24/2022    CALCIUM 9.6 03/24/2022    BILITOT <0.2 03/24/2022    ALKPHOS 69 03/24/2022    AST 13 03/24/2022    ALT 18 03/24/2022       POC Tests: No results for input(s): POCGLU, POCNA, POCK, POCCL, POCBUN, POCHEMO, POCHCT in the last 72 hours.     Coags: No results found for: PROTIME, INR, APTT    HCG (If Applicable):   Lab Results   Component Value Date    PREGTESTUR Negative 04/25/2022        ABGs: No results found for: PHART, PO2ART, HFK9BBS, RSL2BMY, BEART, W2HLMZBJ     Type & Screen (If Applicable):  No results found for: LABABO, LABRH    Drug/Infectious Status (If Applicable):  No results found for: HIV, HEPCAB    COVID-19 Screening (If Applicable): No results found for: COVID19        Anesthesia Evaluation    Airway: Mallampati: II  TM distance: >3 FB   Neck ROM: full  Mouth opening: > = 3 FB Dental: normal exam         Pulmonary:Negative Pulmonary ROS and normal exam                               Cardiovascular:Negative CV ROS  Exercise tolerance: good (>4 METS),           Rhythm: regular  Rate: normal           Beta Blocker:  Not on Beta Blocker         Neuro/Psych:   (+) neuromuscular disease:, headaches:,             GI/Hepatic/Renal: Neg GI/Hepatic/Renal ROS            Endo/Other: Negative Endo/Other ROS                    Abdominal:             Vascular: negative vascular ROS. Other Findings:           Anesthesia Plan      MAC     ASA 2       Induction: intravenous. Anesthetic plan and risks discussed with patient. Use of blood products discussed with patient whom.                    Zach Mora, APRN - CRNA   4/25/2022

## 2022-04-25 NOTE — OP NOTE
Endoscopic Procedure Note    Patient: Martin Cost: 1980  Med Rec#: [id number] Acc#: [id number]     Primary Care Provider DAVID Meza - CNP    Endoscopist: Kayleigh Terrell MD, MD    Date of Procedure:  4/25/2022    Procedure:   1. EGD with a Maddox bougie dilation of esophagus and cold biopsies    Indications:     1. Dysphagia, unspecified type    2. Fibromyalgia    3. Chronic GERD      Anesthesia:  Sedation was administered by anesthesia who monitored the patient during the procedure. Estimated Blood Loss: minimal    Procedure:   After reviewing the patient's chart and obtaining informed consent, the patient was placed in the left lateral decubitus position. A forward-viewing Olympus endoscope was lubricated and inserted through the mouth into the oropharynx. Under direct visualization, the upper esophagus was intubated. The scope was advanced to the level of the third portion of duodenum. Scope was slowly withdrawn with careful inspection of the mucosal surfaces. The scope was retroflexed for inspection of the gastric fundus and incisura. Findings and maneuvers are listed in impression below. Next, a lubricated Maddox weighted Bougie dilator-54 Fr was gently introduced into the patient's mouth and passed into the Esophagus and into the proximal stomach without much resistance and then withdrawn. Repeat EGD was performed to verify dilation and scope tip was passed into the stomach. NO evidence of perforation or excessive bleeding was noted subsequent to the dilation. The patient tolerated the procedure well. The scope was removed. There were no immediate complications. Findings/IMPRESSION:  Esophagus: normal except for a inlet patch in the upper third of the esophagus immediately below the upper esophageal sphincter area and a normal EG junction at 36 cm.     NO erosions or ulcers or nodules or strictures or webs or rings or mass lesions or extrinsic compression or diverticula noted. An empirical Maddox 54 fr bougie dilation was performed and random cold biopsies were taken to check for EoE and NERD. There is no obvious hiatal hernia present. Stomach:  normal .    NO ulcers or masses or gastric outlet obstruction or retained food or fluid. Rugae were normal and lumen distended well with insufflation. Retroflexed views otherwise revealed a normal GE junction, fundus and cardia as well. Duodenum: normal       RECOMMENDATIONS:    1. Await path results, the patient will be contacted in 7-10 days with biopsy results. 2.  Magic mouthwash 5 ml PO Swish and swallow q3h PRN ONLY IF patient has post-procedural sorethroat or chest pain. 3. Full liquids to soft diet today ayana discharge from the surgicenter; may advance  diet starting in AM tomorrow. 4. May resume other meds except any ASA/NSAIDs; may use cough drops or lozenges PRN; also OTC/prescription PPI or H2RA PO qday or BID with anti-GERD measures. 5. NO ASA/NSAIDs x 2 weeks  6. OP f/u in 4-6 weeks with Ms. Amira Hackett; will consider an Esophageal manometry later if the patient's dysphagia persists. The results were discussed with the patient and family. A copy of the images obtained were given to the patient.      Faye Caal MD, MD  4/25/2022  11:03 AM

## 2022-04-25 NOTE — TELEPHONE ENCOUNTER
04-25-22 Per Dr Waldo Chang note     May resume other meds except any ASA/NSAIDs; may use cough drops or lozenges PRN; also OTC/prescription PPI or H2RA PO qday or BID with anti-GERD measures      Jennifer from [facility] called and stated that this patient needed a rx for Omeprazole       Routed to [facility]

## 2022-04-25 NOTE — H&P
Estuardo is requesting a referral to urology so the he can schedule with Dr. Calles. Please call once in system, [phone]    Patient Name: Fiorella Turcios  : 1980  MRN: [id number]  DATE: 22    Allergies: No Known Allergies     ENDOSCOPY  History and Physical    Procedure:    [] Diagnostic Colonoscopy       [] Screening Colonoscopy  [x] EGD      [] ERCP      [] EUS       [] Other    [x] Previous office notes/History and Physical reviewed from the patients chart. Please see EMR for further details of HPI. I have examined the patient's status immediately prior to the procedure and:      Indications/HPI:  1. Dysphagia, unspecified type    2. Fibromyalgia    3.  chronic GERD    Hx EGD with esophageal dilatation in  in [place]  in the past.  She reports symptoms are chronic. Hx fibromyalgia. She states during flares of fibromyalgia, swallowing and choking are worse. Pt c/o choking and foods getting stuck. Sometimes it's in throat other times lower in chest.  She will have vomiting with this. Meat, potatoes and bread are worse. Sometimes it will occur with liquids as well. []Abdominal Pain   []Cancer- GI/Lung     []Fhx of colon CA/polyps  []History of Polyps  []Barretts            []Melena  []Abnormal Imaging              []Dysphagia              []Persistent Pneumonia   []Anemia                            []Food Impaction        []History of Polyps  [] GI Bleed             []Pulmonary nodule/Mass   []Change in bowel habits []Heartburn/Reflux  []Rectal Bleed (BRBPR)  []Chest Pain - Non Cardiac []Heme (+) Stool []Ulcers  []Constipation  []Hemoptysis  []Varices  []Diarrhea  []Hypoxemia    []Nausea/Vomiting   []Screening   []Crohns/Colitis  []Other:     Anesthesia:   [x] MAC [] Moderate Sedation   [] General   [] None     ROS: 12 pt Review of Symptoms was negative unless mentioned above    Medications:   Prior to Admission medications    Medication Sig Start Date End Date Taking?  Authorizing Provider   amitriptyline (ELAVIL) 25 MG tablet Take 1 tablet by mouth daily 3/24/22   DAVID Murray (UBRELVY) 100 MG TABS Take 1 tablet at the onset of migraine. May repeat once in 2 hours if no improvement. Do not exceed 2 tablets in 24 hours. Patient not taking: Reported on 2022 3/24/22   DAVID Silva       Past Medical History:  Past Medical History:   Diagnosis Date    Dysphagia     Fibromyalgia     Migraines        Past Surgical History:  Past Surgical History:   Procedure Laterality Date     SECTION  ,      SECTION      x3    CHOLECYSTECTOMY  2009    ENDOMETRIAL ABLATION      ESOPHAGEAL DILATATION      LEG SURGERY  1990    right, mass, normal    LEG SURGERY      TUBAL LIGATION      UPPER GASTROINTESTINAL ENDOSCOPY      [place], Dilation per patient     WISDOM TOOTH EXTRACTION Bilateral        Social History:  Social History     Tobacco Use    Smoking status: Never Smoker    Smokeless tobacco: Never Used   Vaping Use    Vaping Use: Never used   Substance Use Topics    Alcohol use: No    Drug use: No       Vital Signs:   Vitals:    22 0904   BP: 131/76   Pulse: 80   Resp: 16   Temp: 97.8 °F (36.6 °C)   SpO2: 100%        Physical Exam:  Cardiac:  [x]WNL  []Comments:  Pulmonary:  [x]WNL   []Comments:  Neuro/Mental Status:  [x]WNL  []Comments:  Abdominal:  [x]WNL    []Comments:  Other:   []WNL  []Comments:    Informed Consent:  The risks and benefits of the procedure have been discussed with either the patient or if they cannot consent, their representative. Assessment:  Patient examined and appropriate for planned sedation and procedure. Plan:  Proceed with planned sedation and procedure as above.          Mj Desouza MD

## 2022-04-25 NOTE — ANESTHESIA POSTPROCEDURE EVALUATION
Department of Anesthesiology  Postprocedure Note    Patient: Lynsey Davis  MRN: [id number]  YOB: 1980  Date of evaluation: 4/25/2022  Time:  11:49 AM     Procedure Summary     Date: 04/25/22 Room / Location: [address]    Anesthesia Start: 1364 Anesthesia Stop:     Procedures:       EGD BIOPSY (N/A Abdomen)      EGD ESOPHAGOGASTRODUODENOSCOPY DILATATION Diagnosis: (DYSPHAGIA, CHOKING)    Surgeons: Geovanni Alford MD Responsible Provider: DAVID Green CRNA    Anesthesia Type: MAC ASA Status: 2          Anesthesia Type: No value filed. Sara Phase I: Sara Score: 10    Sara Phase II:      Last vitals: Reviewed and per EMR flowsheets.        Anesthesia Post Evaluation    Patient location during evaluation: bedside  Patient participation: complete - patient participated  Level of consciousness: awake and alert  Pain score: 0  Airway patency: patent  Nausea & Vomiting: no nausea and no vomiting  Complications: no  Cardiovascular status: blood pressure returned to baseline  Respiratory status: acceptable  Hydration status: stable

## 2022-04-27 RX ORDER — OMEPRAZOLE 20 MG/1
20 CAPSULE, DELAYED RELEASE ORAL DAILY
Qty: 30 CAPSULE | Refills: 11 | Status: SHIPPED | OUTPATIENT
Start: 2022-04-27

## 2022-04-28 NOTE — TELEPHONE ENCOUNTER
04-28-22 Called and notified patient of rx as per [address] agreement per patient

## 2022-05-06 RX ORDER — RIZATRIPTAN BENZOATE 10 MG/1
10 TABLET ORAL
Qty: 12 TABLET | Refills: 2 | Status: SHIPPED | OUTPATIENT
Start: 2022-05-06 | End: 2022-09-16 | Stop reason: SDUPTHER

## 2022-05-31 ENCOUNTER — OFFICE VISIT (OUTPATIENT)
Dept: GASTROENTEROLOGY | Age: 42
End: 2022-05-31
Payer: COMMERCIAL

## 2022-05-31 VITALS
BODY MASS INDEX: 43.84 KG/M2 | HEIGHT: 61 IN | SYSTOLIC BLOOD PRESSURE: 108 MMHG | WEIGHT: 232.2 LBS | HEART RATE: 68 BPM | OXYGEN SATURATION: 97 % | DIASTOLIC BLOOD PRESSURE: 72 MMHG

## 2022-05-31 DIAGNOSIS — K21.00 GASTROESOPHAGEAL REFLUX DISEASE WITH ESOPHAGITIS WITHOUT HEMORRHAGE: Primary | ICD-10-CM

## 2022-05-31 PROCEDURE — 99213 OFFICE O/P EST LOW 20 MIN: CPT | Performed by: NURSE PRACTITIONER

## 2022-05-31 ASSESSMENT — ENCOUNTER SYMPTOMS
CHOKING: 0
CONSTIPATION: 0
DIARRHEA: 0
RECTAL PAIN: 0
COUGH: 0
ANAL BLEEDING: 0
SHORTNESS OF BREATH: 0
TROUBLE SWALLOWING: 0
VOMITING: 0
ABDOMINAL DISTENTION: 0
ABDOMINAL PAIN: 0
NAUSEA: 0
BLOOD IN STOOL: 0

## 2022-05-31 NOTE — PROGRESS NOTES
Subjective:     Patient ID: Catalina Porras is a 46 y.o. female  PCP: DAVID Mccall CNP  Referring Provider: No ref. provider found    HPI  Patient presents to the office today with the following complaints: Follow-up      Pt seen today for follow up after EGD on 4/25/22 for c/o choking and reflux. Pt reports since EGD with dilation she has not had any further choking episodes. She reports reflux currently controlled with Omeprazole 20 mg po daily. EGD Findings/IMPRESSION 4/25/22:  Esophagus: normal except for a inlet patch in the upper third of the esophagus immediately below the upper esophageal sphincter area and a normal EG junction at 36 cm. NO erosions or ulcers or nodules or strictures or webs or rings or mass lesions or extrinsic compression or diverticula noted. An empirical Maddox 54 fr bougie dilation was performed and random cold biopsies were taken to check for EoE and NERD. There is no obvious hiatal hernia present. Stomach:  normal .  NO ulcers or masses or gastric outlet obstruction or retained food or fluid. Rugae were normal and lumen distended well with insufflation. Retroflexed views otherwise revealed a normal GE junction, fundus and cardia as well. Duodenum: normal  RECOMMENDATIONS:    1. Await path results, the patient will be contacted in 7-10 days with biopsy results. 2.  Magic mouthwash 5 ml PO Swish and swallow q3h PRN ONLY IF patient has post-procedural sorethroat or chest pain. 3. Full liquids to soft diet today ayana discharge from the surgicenter; may advance  diet starting in AM tomorrow. 4. May resume other meds except any ASA/NSAIDs; may use cough drops or lozenges PRN; also OTC/prescription PPI or H2RA PO qday or BID with anti-GERD measures. 5. NO ASA/NSAIDs x 2 weeks  6. OP f/u in 4-6 weeks with Ms. Amira Hackett; will consider an Esophageal manometry later if the patient's dysphagia persists.     FINAL DIAGNOSIS:   Esophagus, random esophageal biopsies:    Benign esophageal mucosa with changes consistent with reflux esophagitis, negative for evidence of eosinophilic esophagitis. All scope and pathology reports were reviewed and discussed with patient. All questions answered, pt verbalized understanding. Assessment:     1. Gastroesophageal reflux disease with esophagitis without hemorrhage            Plan:   - Continue Omeprazole 20 mg po daily  - Follow up yearly for medication refills   - Call with any questions or concerns       Orders  No orders of the defined types were placed in this encounter. Medications  No orders of the defined types were placed in this encounter.         Patient History:     Past Medical History:   Diagnosis Date    Dysphagia     Fibromyalgia     Migraines        Past Surgical History:   Procedure Laterality Date     SECTION  ,      SECTION      x3    CHOLECYSTECTOMY      ENDOMETRIAL ABLATION      ESOPHAGEAL DILATATION      LEG SURGERY      right, mass, normal    LEG SURGERY      TUBAL LIGATION      UPPER GASTROINTESTINAL ENDOSCOPY      [place], Dilation per patient     UPPER GASTROINTESTINAL ENDOSCOPY N/A 2022    Dr Piero Hercules, [address] Fr dil, inlet patch in upper esoph,    UPPER GASTROINTESTINAL ENDOSCOPY  2022    Dr Yajaira Gandhi 47 Fr bougie dilation    WISDOM TOOTH EXTRACTION Bilateral        Family History   Problem Relation Age of Onset    High Blood Pressure Mother     Celiac Disease Mother     High Cholesterol Mother     Diabetes Maternal Grandmother     Stroke Maternal Grandmother     Heart Attack Maternal Grandfather     Cancer Paternal Grandfather     Colon Cancer Neg Hx     Esophageal Cancer Neg Hx     Liver Cancer Neg Hx     Rectal Cancer Neg Hx     Stomach Cancer Neg Hx        Social History     Socioeconomic History    Marital status:      Spouse name: None    Number of children: None    Years of education: None the morning on an empty stomach. 30 capsule 11    amitriptyline (ELAVIL) 25 MG tablet Take 1 tablet by mouth daily 30 tablet 2     No current facility-administered medications for this visit. No Known Allergies    Review of Systems   Constitutional: Negative for activity change, appetite change, fatigue, fever and unexpected weight change. HENT: Negative for trouble swallowing. Respiratory: Negative for cough, choking and shortness of breath. Cardiovascular: Negative for chest pain. Gastrointestinal: Negative for abdominal distention, abdominal pain, anal bleeding, blood in stool, constipation, diarrhea, nausea, rectal pain and vomiting. Allergic/Immunologic: Negative for food allergies. All other systems reviewed and are negative. Objective:     /72   Pulse 68   Ht 5' 1\" (1.549 m)   Wt 232 lb 3.2 oz (105.3 kg)   SpO2 97%   BMI 43.87 kg/m²     Physical Exam  Vitals reviewed. Constitutional:       General: She is not in acute distress. Appearance: She is well-developed. HENT:      Head: Normocephalic and atraumatic. Right Ear: External ear normal.      Left Ear: External ear normal.      Nose: Nose normal.      Comments: Mask on     Mouth/Throat:      Comments: Mask on  Eyes:      General: No scleral icterus. Right eye: No discharge. Left eye: No discharge. Conjunctiva/sclera: Conjunctivae normal.      Pupils: Pupils are equal, round, and reactive to light. Cardiovascular:      Rate and Rhythm: Normal rate and regular rhythm. Heart sounds: Normal heart sounds. No murmur heard. Pulmonary:      Effort: Pulmonary effort is normal. No respiratory distress. Breath sounds: Normal breath sounds. No wheezing or rales. Abdominal:      General: Bowel sounds are normal. There is no distension. Palpations: Abdomen is soft. There is no mass. Tenderness: There is no abdominal tenderness. There is no guarding or rebound. Musculoskeletal:         General: Normal range of motion. Cervical back: Normal range of motion and neck supple. Skin:     General: Skin is warm and dry. Coloration: Skin is not pale. Neurological:      Mental Status: She is alert and oriented to person, place, and time.    Psychiatric:         Behavior: Behavior normal.

## 2022-05-31 NOTE — PATIENT INSTRUCTIONS
Patient Education        Gastroesophageal Reflux Disease (GERD): Care Instructions  Overview     Gastroesophageal reflux disease (GERD) is the backward flow of stomach acid into the esophagus. The esophagus is the tube that leads from your throat to your stomach. A one-way valve prevents the stomach acid from backing up into this tube. But when you have GERD, this valve does not close tightly enough. This can also cause pain and swelling in your esophagus. (This is calledesophagitis.)  If you have mild GERD symptoms including heartburn, you may be able to control the problem with antacids or over-the-counter medicine. You can also make lifestyle changes to help reduce your symptoms. These include changing yourdiet and eating habits, such as not eating late at night and losing weight. Follow-up care is a key part of your treatment and safety. Be sure to make and go to all appointments, and call your doctor if you are having problems. It's also a good idea to know your test results and keep alist of the medicines you take. How can you care for yourself at home?  Take your medicines exactly as prescribed. Call your doctor if you think you are having a problem with your medicine.  Your doctor may recommend over-the-counter medicine. For mild or occasional indigestion, antacids, such as Tums, Gaviscon, Mylanta, or Maalox, may help. Your doctor also may recommend over-the-counter acid reducers, such as famotidine (Pepcid AC), cimetidine (Tagamet HB), or omeprazole (Prilosec). Read and follow all instructions on the label. If you use these medicines often, talk with your doctor.  Change your eating habits. ? It's best to eat several small meals instead of two or three large meals. ? After you eat, wait 2 to 3 hours before you lie down. ? Avoid foods that make your symptoms worse.  These may include chocolate, mint, alcohol, pepper, spicy foods, high-fat foods, or drinks with caffeine in them, such as tea, coffee, kecia, or energy drinks. If your symptoms are worse after you eat a certain food, you may want to stop eating it to see if your symptoms get better.  Do not smoke or chew tobacco. Smoking can make GERD worse. If you need help quitting, talk to your doctor about stop-smoking programs and medicines. These can increase your chances of quitting for good.  If you have GERD symptoms at night, raise the head of your bed 6 to 8 inches by putting the frame on blocks or placing a foam wedge under the head of your mattress. (Adding extra pillows does not work.)   Do not wear tight clothing around your middle.  Lose weight if you need to. Losing just 5 to 10 pounds can help. When should you call for help? Call your doctor now or seek immediate medical care if:     You have new or different belly pain.      Your stools are black and tarlike or have streaks of blood. Watch closely for changes in your health, and be sure to contact your doctor if:     Your symptoms have not improved after 2 days.      Food seems to catch in your throat or chest.   Where can you learn more? Go to [email or web address][employer]. org and sign in to your [employer] account. Enter Z082 in the [facility] box to learn more about \"Gastroesophageal Reflux Disease (GERD): Care Instructions. \"     If you do not have an account, please click on the \"Sign Up Now\" link. Current as of: September 8, 2021               Content Version: 13.2  © 8933-9205 [employer]wise, [facility]. Care instructions adapted under license by [facility] ([facility]). If you have questions about a medical condition or this instruction, always ask your healthcare professional. [place] [address] any warranty or liability for your use of this information.

## 2022-06-16 ENCOUNTER — OFFICE VISIT (OUTPATIENT)
Dept: NEUROLOGY | Age: 42
End: 2022-06-16
Payer: COMMERCIAL

## 2022-06-16 VITALS
HEIGHT: 61 IN | WEIGHT: 225 LBS | HEART RATE: 60 BPM | OXYGEN SATURATION: 99 % | BODY MASS INDEX: 42.48 KG/M2 | DIASTOLIC BLOOD PRESSURE: 75 MMHG | SYSTOLIC BLOOD PRESSURE: 120 MMHG

## 2022-06-16 DIAGNOSIS — R20.0 NUMBNESS AND TINGLING: ICD-10-CM

## 2022-06-16 DIAGNOSIS — G43.009 MIGRAINE WITHOUT AURA AND WITHOUT STATUS MIGRAINOSUS, NOT INTRACTABLE: Primary | ICD-10-CM

## 2022-06-16 DIAGNOSIS — R20.2 NUMBNESS AND TINGLING: ICD-10-CM

## 2022-06-16 DIAGNOSIS — R41.3 MEMORY CHANGE: ICD-10-CM

## 2022-06-16 PROCEDURE — 99214 OFFICE O/P EST MOD 30 MIN: CPT | Performed by: NURSE PRACTITIONER

## 2022-06-16 NOTE — PROGRESS NOTES
[facility] NEUROLOGY:    Patient: Karolina Kwon   :  1980  Age:  46 y.o. MRN:  [id number]  Today:  22    Provider: DAVID Hdz    Chief Complaint:  Chief Complaint   Patient presents with    Migraine     follow up       PCP: DAVID Mendes - CNP    HISTORY OF PRESENT ILLNESS:   Karolina Kwon is a 46y.o. year old female here for 3 month follow up of migraines and memory loss. History of fibromyalgia. Headache characteristics have not changed, she notes pain is retro orbital with radiation globally. She has associated nausea, photophobia and phonophobia. She has episodic left sided numbness as well that is not always associated with headaches. She has been on low dose Amitriptyline for about a year, at last visit it was increased to 25 mg nightly with Maxalt and Ubrelvy PRN added. Angelita Moots was not covered by insurance. The increased dosage of Amitriptyline and Maxalt have been beneficial. She notes decreased migraines, about 0-3 headaches monthly. She is also having some short term memory complaints and brain fog. This has not changed since last visit. Not interfering with any ADL's and she is currently working. States this started after Covid infection in November. At last visit patient was also having trouble with swallowing and choking. She had endoscopy with noted esophageal stricture. Status post esophageal dilation symptoms have resolved.      Additional Relevant History:  History of head/neck trauma: No  History of head/neck surgery:  No  Family h/o headaches or aneurysm: No    PAST MEDICAL HISTORY:    Medical History:      Diagnosis Date    Dysphagia     Fibromyalgia     Migraines        Surgical History:      Procedure Laterality Date     SECTION  ,      SECTION      x3    CHOLECYSTECTOMY  2009    ENDOMETRIAL ABLATION      ESOPHAGEAL DILATION      LEG [place]    right, mass, normal    LEG SURGERY      TUBAL LIGATION  2013    UPPER GASTROINTESTINAL ENDOSCOPY  2020    [place], Dilation per patient     UPPER GASTROINTESTINAL ENDOSCOPY N/A 04/25/2022    Dr Tangela Aguilar, W 47 Fr dil, inlet patch in upper esoph,    UPPER GASTROINTESTINAL ENDOSCOPY  04/25/2022    Dr Yajaira Gandhi 47 Fr bougie dilation    WISDOM TOOTH EXTRACTION Bilateral        Current Medications:  Current Outpatient Medications   Medication Sig Dispense Refill    omeprazole (PRILOSEC) 20 MG delayed release capsule Take 1 capsule by mouth Daily Take first thing in the morning on an empty stomach. 30 capsule 11    amitriptyline (ELAVIL) 25 MG tablet Take 1 tablet by mouth daily 30 tablet 2    rizatriptan (MAXALT) 10 MG tablet Take 1 tablet by mouth once as needed for Migraine May repeat in 2 hours if needed 12 tablet 2     No current facility-administered medications for this visit. Allergies:  Patient has no known allergies. SOCIAL HISTORY:   Social History     Socioeconomic History    Marital status:      Spouse name: Not on file    Number of children: Not on file    Years of education: Not on file    Highest education level: Not on file   Occupational History    Not on file   Tobacco Use    Smoking status: Never Smoker    Smokeless tobacco: Never Used   Vaping Use    Vaping Use: Never used   Substance and Sexual Activity    Alcohol use: No    Drug use: No    Sexual activity: Yes     Partners: Male   Other Topics Concern    Not on file   Social History Narrative    Not on file     Social Determinants of Health     Financial Resource Strain:     Difficulty of Paying Living Expenses: Not on file   Food Insecurity:     Worried About Running Out of Food in the Last Year: Not on file    Maura of Food in the Last Year: Not on file   Transportation Needs:     Lack of Transportation (Medical): Not on file    Lack of Transportation (Non-Medical):  Not on file   Physical Activity:     Days of Exercise per Week: Not on file    Minutes of Exercise per Session: Not on file   Stress:     Feeling of Stress : Not on file   Social Connections:     Frequency of Communication with Friends and Family: Not on file    Frequency of Social Gatherings with Friends and Family: Not on file    Attends Oriental orthodox Services: Not on file    Active Member of [address] [place] [address] [employer] or Organizations: Not on file    Attends Club or Organization Meetings: Not on file    Marital Status: Not on file   Intimate Partner Violence:     Fear of Current or Ex-Partner: Not on file    Emotionally Abused: Not on file    Physically Abused: Not on file    Sexually Abused: Not on file   Housing Stability:     Unable to Pay for Housing in the Last Year: Not on file    Number of [place] in the Last Year: Not on file    Unstable Housing in the Last Year: Not on file       FAMILY HISTORY:       Problem Relation Age of Onset    High Blood Pressure Mother     Celiac Disease Mother     High Cholesterol Mother     Diabetes Maternal Grandmother     Stroke Maternal Grandmother     Heart Attack Maternal Grandfather     Cancer Paternal Grandfather     Colon Cancer Neg Hx     Esophageal Cancer Neg Hx     Liver Cancer Neg Hx     Rectal Cancer Neg Hx     Stomach Cancer Neg Hx        REVIEW OF SYSTEMS:  Constitutional: []? Fever []? Sweats []? Chills []? Recent Injury   [x]? Denies all unless marked  HENT:[x]? Headache  []? Head Injury  []? Sore Throat  []? Ear Pain  []? Dizziness []? Hearing Loss   []? Denies all unless marked  Musculoskeletal: []? Arthralgia  []? Myalgias []? Muscle cramps  []? Muscle twitches   [x]? Denies all unless marked   Spine:  []? Neck pain  []? Back pain  []? Sciaticia  [x]? Denies all unless marked  Neurological:[]? Visual Disturbance []? Double Vision []? Slurred Speech []? Trouble swallowing  []? Vertigo []? Tingling []? Numbness []? Weakness []? Loss of Balance   []? Loss of Consciousness []? Memory Loss []? Seizures  [x]?  Denies all unless marked  Psychiatric/Behavioral:[]? Depression []? Anxiety  [x]? Denies all unless marked  Sleep: []? Insomnia []? Sleep Disturbance []? Snoring []? Restless Legs []? Daytime Sleepiness []? Sleep Apnea  [x]? Denies all unless marked    The MA has completed the ROS with the patient. I have reviewed it in its' entirety with the patient and agree with the documentation. PHYSICAL EXAMINATION:  Vitals: /75   Pulse 60   Ht 5' 1\" (1.549 m)   Wt 225 lb (102.1 kg)   SpO2 99%   Breastfeeding No   BMI 42.51 kg/m²   Constitutional - No acute distress    HEENT- Conjunctiva normal.  No scars, masses, or lesions over external nose or ears, no neck masses noted, no jugular vein distension, no bruit  Cardiac- Regular rate and rhythm  Pulmonary- Clear to auscultation, good expansion, normal effort without use of accessory muscles  Musculoskeletal - No significant wasting of muscles noted, no bony deformities  Extremities - No clubbing, cyanosis or edema  Skin - Warm, dry, and intact. No rash, erythema, or pallor  Psychiatric - Mood, affect, and behavior appear normal          NEUROLOGIC EXAMINATION:    Mental status   [x]Awake, alert, oriented   [x]Affect attention and concentration appear appropriate  [x]Recent and remote memory appears unremarkable  [x]Speech normal without dysarthria or aphasia, comprehension and repetition intact.    COMMENTS:    Cranial Nerves [x]No VF deficit to confrontation  [x]PERRLA, EOMI, no nystagmus, conjugate eye movements, no ptosis  [x]Face symmetric  [x]Facial sensation intact  [x]Tongue midline no atrophy or fasciculations present  [x]Palate midline, hearing to finger rub normal bilaterally  [x]Shoulder shrug and SCM testing normal bilaterally  COMMENTS:   Motor   [x]5/5 strength x 4 extremities  [x]Normal bulk and tone  [x]No tremor present  [x]No rigidity or bradykinesia noted  COMMENTS:   Sensory  [x]Sensation intact to light touch, pin prick, vibration, and proprioception BLE  [x]Sensation intact to light touch, pin prick, vibration, and proprioception BUE  COMMENTS:   Coordination [x]FTN normal bilaterally   [x]HTS normal bilaterally  [x]LATOYA normal bilaterally. COMMENTS:   Reflexes  [x]Symmetric and non-pathological  [x]Toes down going bilaterally  [x]No clonus present  COMMENTS:   Gait                  [x]Normal steady gait    []Ataxic    []Spastic     []Magnetic     []Shuffling  COMMENTS:       ADDITIONAL REVIEW:  Lab Results   Component Value Date    [id number] 549 03/24/2022     Lab Results   Component Value Date    WBC 11.1 (H) 03/24/2022    HGB 13.6 03/24/2022    HCT 43.1 03/24/2022    MCV 94.9 03/24/2022     (H) 03/24/2022     Lab Results   Component Value Date     03/24/2022    K 4.5 03/24/2022     03/24/2022    CO2 24 03/24/2022    BUN 24 (H) 03/24/2022    CREATININE 0.7 03/24/2022    GLUCOSE 102 03/24/2022    CALCIUM 9.6 03/24/2022    PROT 7.9 03/24/2022    PROT 7.8 03/24/2022    LABALBU 5.1 03/24/2022    LABALBU 4.31 03/24/2022    BILITOT <0.2 03/24/2022    ALKPHOS 69 03/24/2022    AST 13 03/24/2022    ALT 18 03/24/2022    LABGLOM >60 03/24/2022    GFRAA >59 03/24/2022     Lab Results   Component Value Date    TSH 0.922 01/09/2018     MRI Brain W WO 4/8/22  Narrative   Examination. MRI BRAIN W WO CONTRAST 4/8/2022 7:56 AM   History: Migraine. The multiplanar, multisequence MR imaging of the brain is performed   without intravenous contrast enhancement. There is no previous study for comparison. The correlation made with   CT scan of the head dated 10/10/2017. The diffusion-weighted imaging sequence including the ADC map show no   areas of restricted diffusion. .   There is no evidence of a mass. No midline shift.    The ventricles, the basal cistern and the cortical sulci are normal.   The orbits, the optic nerves, the optic chiasm and optic tract   bilaterally normal and symmetrical.   The limited visualized intracranial nerves are normal and symmetrical.   The cochlea gland is normal. The corpus callosum, the brainstem and   cerebellum are normal.   There are no areas of abnormal focal or diffuse contrast enhancement. The FLAIR sequence images show a single small focus of T2 signal in   the left insular cortex/subcortical white matter in the posterior   aspect of the left sylvian cistern. No mass effect. It shows no   contrast enhancement. The remaining brain show no abnormal T2 signal   abnormalities. The gradient recalled imaging sequence show normal flow/flow void in   the limited visualized intracranial vessels. There is mucosal thickening involving the left maxillary antrum. The   mastoid air cells are clear.       Impression   1. Small focus of nonspecific T2 signal abnormality in the left   temporal/insular subcortical white matter may be related to patient's   migraine headache. No mass. No abnormal enhancement. 2. The remaining brain is unremarkable. 3. Evidence of maxillary sinusitis. Signed by Dr Lani Aguero:  Kern Fleischer is a 46 y.o. female here today for 3 month follow up of migraines. She states that she has had significant decrease in headache number since increasing Amitriptyline to 25 mg at bedtime. She does note that it has some sedative effects and if she takes it too late at night she has trouble getting up in the morning. She states she has to take it no later than 7:00 pm. If this is still a problem in the future may consider changing to a different preventative but hesitant to make changes since it is working from migraine standpoint and she has only been on increased dose for 3 months. She is understanding and in agreement. She has taken Maxalt twice and states it has succesfully aborted migraines. No noted side effects. Collie Standing was not covered by insurance. MRI showed T2 signal abnormality in left temporal/insular subcortical white matter consistent with migraine headaches.  Labs were overall unremarkable, noted slight increased BUN. Patient states she does not drink very much during the day. Discussed importance of hydration. Imaging and labs discussed with patient and questions answered. Brain fog/short term memory concerns unchanged. Will continue to monitor this clinically. ICD-10-CM    1. Migraine without aura and without status migrainosus, not intractable  G43.009    2. Memory change  R41.3    3. Numbness and tingling  R20.0     R20.2          PLAN:  1. Continue Amitriptyline. 2. Maxalt PRN. 3. Patient advised of the pathophysiology, etiology, and diagnosis of migraines, along with treatment options, and treatment side effects. 4. Return in about 3 months (around 9/16/2022) for Schedule with Courtney Lynn.      Kota Santoss, APRN - CNP

## 2022-09-16 ENCOUNTER — OFFICE VISIT (OUTPATIENT)
Dept: NEUROLOGY | Age: 42
End: 2022-09-16
Payer: COMMERCIAL

## 2022-09-16 VITALS
HEIGHT: 61 IN | DIASTOLIC BLOOD PRESSURE: 94 MMHG | BODY MASS INDEX: 42.48 KG/M2 | HEART RATE: 62 BPM | SYSTOLIC BLOOD PRESSURE: 135 MMHG | WEIGHT: 225 LBS

## 2022-09-16 DIAGNOSIS — G43.009 MIGRAINE WITHOUT AURA AND WITHOUT STATUS MIGRAINOSUS, NOT INTRACTABLE: Primary | ICD-10-CM

## 2022-09-16 PROCEDURE — 99213 OFFICE O/P EST LOW 20 MIN: CPT | Performed by: NURSE PRACTITIONER

## 2022-09-16 RX ORDER — TOPIRAMATE 50 MG/1
50 TABLET, FILM COATED ORAL DAILY
Qty: 60 TABLET | Refills: 3 | Status: SHIPPED | OUTPATIENT
Start: 2022-09-16

## 2022-09-16 RX ORDER — RIZATRIPTAN BENZOATE 10 MG/1
10 TABLET ORAL
Qty: 12 TABLET | Refills: 5 | Status: SHIPPED | OUTPATIENT
Start: 2022-09-16 | End: 2022-09-16

## 2022-09-16 NOTE — PROGRESS NOTES
[facility] NEUROLOGY:    Patient: Jennifer Anderson   :  1980  Age:  38 y.o. MRN:  [id number]  Today:  22    Provider: DAVID Simons    Chief Complaint:  Chief Complaint   Patient presents with    Migraine     Pt states she has been having a lot of migraines lately        PCP: DAVID Chau - [facility]    HISTORY OF PRESENT ILLNESS:   Jennifer Anderson is a 38y.o. year old female here for 3 month follow up of migraines. She reports worsening of headaches, states they have been daily for last few weeks. Denies changes to headache characteristics. She does relate she has trouble with the weather change in the fall and this month can be triggering for headaches. Still notes pain is retro orbital or frontal with radiation globally. There is some pain to occipital area as well. She has associated nausea, photophobia and phonophobia. She has episodic left sided numbness as well that is not always associated with headaches. She has been on low dose Amitriptyline that was increased to 25 mg nightly. Maxalt used PRN with benefit. Devora Cantu PRN as not covered by insurance. She is also having some short term memory complaints and brain fog. This has not changed since last visit. Not interfering with any ADL's and she is currently working. States this started after Covid infection in November. There is history of fibromyalgia. Also reports bilateral ears have some pressure.      Additional Relevant History:  History of head/neck trauma: No  History of head/neck surgery:  No  Family h/o headaches or aneurysm: No    PAST MEDICAL HISTORY:    Medical History:      Diagnosis Date    Dysphagia     Fibromyalgia     Migraines        Surgical History:      Procedure Laterality Date     SECTION  ,      SECTION      x3    CHOLECYSTECTOMY  2009    [facility]    right, mass, normal    LEG SURGERY      TUBAL LIGATION  2013    UPPER GASTROINTESTINAL ENDOSCOPY  2020    [place], Dilation per patient     UPPER GASTROINTESTINAL ENDOSCOPY N/A 04/25/2022    Dr Alicia Gore, W 47 Fr dil, inlet patch in upper esoph,    UPPER GASTROINTESTINAL ENDOSCOPY  04/25/2022    Dr Liudmila Gan 54 Fr bougie dilation    WISDOM TOOTH EXTRACTION Bilateral        Current Medications:  Current Outpatient Medications   Medication Sig Dispense Refill    rizatriptan (MAXALT) 10 MG tablet Take 1 tablet by mouth once as needed for Migraine May repeat in 2 hours if needed 12 tablet 5    topiramate (TOPAMAX) 50 MG tablet Take 1 tablet by mouth daily 60 tablet 3    omeprazole (PRILOSEC) 20 MG delayed release capsule Take 1 capsule by mouth Daily Take first thing in the morning on an empty stomach. 30 capsule 11    amitriptyline (ELAVIL) 25 MG tablet Take 1 tablet by mouth daily 30 tablet 2     No current facility-administered medications for this visit. Allergies:  Patient has no known allergies.     SOCIAL HISTORY:   Social History     Socioeconomic History    Marital status:      Spouse name: Not on file    Number of children: Not on file    Years of education: Not on file    Highest education level: Not on file   Occupational History    Not on file   Tobacco Use    Smoking status: Never    Smokeless tobacco: Never   Vaping Use    Vaping Use: Never used   Substance and Sexual Activity    Alcohol use: No    Drug use: No    Sexual activity: Yes     Partners: Male   Other Topics Concern    Not on file   Social History Narrative    Not on file     Social Determinants of Health     Financial Resource Strain: Not on file   Food Insecurity: Not on file   Transportation Needs: Not on file   Physical Activity: Not on file   Stress: Not on file   Social Connections: Not on file   Intimate Partner Violence: Not on file   Housing Stability: Not on file       FAMILY HISTORY:       Problem Relation Age of Onset    High Blood Pressure Mother     Celiac Disease Mother     High Cholesterol Mother     Diabetes Maternal Grandmother     Stroke Maternal Grandmother     Heart Attack Maternal Grandfather     Cancer Paternal Grandfather     Colon Cancer Neg Hx     Esophageal Cancer Neg Hx     Liver Cancer Neg Hx     Rectal Cancer Neg Hx     Stomach Cancer Neg Hx        REVIEW OF SYSTEMS:  Constitutional: []Fever []Sweats []Chills [] Recent Injury   [x] Denies all unless marked  HENT:[x]Headache  [] Head Injury  [] Sore Throat  [] Ear Pain  [] Dizziness [] Hearing Loss   [x] Denies all unless marked  Musculoskeletal: [] Arthralgia  [] Myalgias [] Muscle cramps  [] Muscle twitches   [x] Denies all unless marked   Spine:  [] Neck pain  [] Back pain  [] Sciaticia  [x] Denies all unless marked  Neurological:[] Visual Disturbance [] Double Vision [] Slurred Speech [] Trouble swallowing  [] Vertigo [] Tingling [] Numbness [] Weakness [] Loss of Balance   [] Loss of Consciousness [] Memory Loss [] Seizures  [x] Denies all unless marked  Psychiatric/Behavioral:[] Depression [] Anxiety  [x] Denies all unless marked  Sleep: []  Insomnia [] Sleep Disturbance [] Snoring [] Restless Legs [] Daytime Sleepiness [] Sleep Apnea  [x] Denies all unless marked    The MA has completed the ROS with the patient. I have reviewed it in its' entirety with the patient and agree with the documentation. PHYSICAL EXAMINATION:  Vitals: BP (!) 135/94   Pulse 62   Ht 5' 1\" (1.549 m)   Wt 225 lb (102.1 kg)   BMI 42.51 kg/m²     Constitutional - No acute distress    HEENT- Conjunctiva normal.  No scars, masses, or lesions over external nose or ears, no neck masses noted, no jugular vein distension, no bruit. TM's visualized with clear serous fluid bilaterally.    Cardiac- Regular rate and rhythm  Pulmonary- Clear to auscultation, good expansion, normal effort without use of accessory muscles  Musculoskeletal - No significant wasting of muscles noted, no bony deformities  Extremities - No clubbing, cyanosis or edema  Skin - Warm, dry, and intact. No rash, erythema, or pallor  Psychiatric - Mood, affect, and behavior appear normal          NEUROLOGIC EXAMINATION:    Mental status   [x]Awake, alert, oriented   [x]Affect attention and concentration appear appropriate  [x]Recent and remote memory appears unremarkable  [x]Speech normal without dysarthria or aphasia, comprehension and repetition intact. COMMENTS:    Cranial Nerves [x]No VF deficit to confrontation  [x]PERRLA, EOMI, no nystagmus, conjugate eye movements, no ptosis  [x]Face symmetric  [x]Facial sensation intact  [x]Tongue midline no atrophy or fasciculations present  [x]Palate midline, hearing to finger rub normal bilaterally  [x]Shoulder shrug and SCM testing normal bilaterally  COMMENTS:   Motor   [x]5/5 strength x 4 extremities  [x]Normal bulk and tone  [x]No tremor present  [x]No rigidity or bradykinesia noted  COMMENTS:   Sensory  [x]Sensation intact to light touch, pin prick, vibration, and proprioception BLE  [x]Sensation intact to light touch, pin prick, vibration, and proprioception BUE  COMMENTS:   Coordination [x]FTN normal bilaterally   [x]HTS normal bilaterally  [x]LATOYA normal bilaterally.    COMMENTS:   Reflexes  [x]Symmetric and non-pathological  [x]Toes down going bilaterally  [x]No clonus present  COMMENTS:   Gait                  [x]Normal steady gait    []Ataxic    []Spastic     []Magnetic     []Shuffling  COMMENTS:       ADDITIONAL REVIEW:  Lab Results   Component Value Date    [id number] 549 03/24/2022     Lab Results   Component Value Date    WBC 11.1 (H) 03/24/2022    HGB 13.6 03/24/2022    HCT 43.1 03/24/2022    MCV 94.9 03/24/2022     (H) 03/24/2022     Lab Results   Component Value Date     03/24/2022    K 4.5 03/24/2022     03/24/2022    CO2 24 03/24/2022    BUN 24 (H) 03/24/2022    CREATININE 0.7 03/24/2022    GLUCOSE 102 03/24/2022    CALCIUM 9.6 03/24/2022    PROT 7.9 03/24/2022    PROT 7.8 03/24/2022 LABALBU 5.1 03/24/2022    LABALBU 4.31 03/24/2022    BILITOT <0.2 03/24/2022    ALKPHOS 69 03/24/2022    AST 13 03/24/2022    ALT 18 03/24/2022    LABGLOM >60 03/24/2022    GFRAA >59 03/24/2022     Lab Results   Component Value Date    TSH 0.922 01/09/2018     MRI Brain W WO 4/8/22  Narrative   Examination. MRI BRAIN W WO CONTRAST 4/8/2022 7:56 AM   History: Migraine. The multiplanar, multisequence MR imaging of the brain is performed   without intravenous contrast enhancement. There is no previous study for comparison. The correlation made with   CT scan of the head dated 10/10/2017. The diffusion-weighted imaging sequence including the ADC map show no   areas of restricted diffusion. .   There is no evidence of a mass. No midline shift. The ventricles, the basal cistern and the cortical sulci are normal.   The orbits, the optic nerves, the optic chiasm and optic tract   bilaterally normal and symmetrical.   The limited visualized intracranial nerves are normal and symmetrical.   The cochlea gland is normal. The corpus callosum, the brainstem and   cerebellum are normal.   There are no areas of abnormal focal or diffuse contrast enhancement. The FLAIR sequence images show a single small focus of T2 signal in   the left insular cortex/subcortical white matter in the posterior   aspect of the left sylvian cistern. No mass effect. It shows no   contrast enhancement. The remaining brain show no abnormal T2 signal   abnormalities. The gradient recalled imaging sequence show normal flow/flow void in   the limited visualized intracranial vessels. There is mucosal thickening involving the left maxillary antrum. The   mastoid air cells are clear. Impression   1. Small focus of nonspecific T2 signal abnormality in the left   temporal/insular subcortical white matter may be related to patient's   migraine headache. No mass. No abnormal enhancement. 2. The remaining brain is unremarkable.    3. Evidence of maxillary sinusitis. Signed by Dr Trey Mock:  Kandy Munoz is a 38 y.o. female here today for 3 month follow up of migraines. She has had increase to migraines over the last few weeks- reports they are daily. No changes to characteristics. Is still taking Amitriptyline 25 mg nightly. Maxalt used PRN with abortive benefit. [place] and [place] was not covered by insurance. MRI showed T2 signal abnormality in left temporal/insular subcortical white matter consistent with migraine headaches. Prior labs were unremarkable. No changes from memory standpoint. Will continue to monitor this clinically. Patient does relate that September/October can cause her allergies to flare up and worsen headaches. There is most likely some sinusitis and serous otitis that can be playing a role in increased number of headaches. She does have serous otitis bilaterally today, recommended OTC Flonase daily. With increased number of headaches will add Topamax on to try to help from prevention standpoint. Goal will be to come off one of her preventatives once headaches are back under control. Topamax may help from pain syndrome standpoint as well as she has fibromyalgia history. Will also give her Ubrelvy samples to try. ICD-10-CM    1. Migraine without aura and without status migrainosus, not intractable  G43.009 rizatriptan (MAXALT) 10 MG tablet     topiramate (TOPAMAX) 50 MG tablet          PLAN:  1. Continue Amitriptyline 25 mg nightly for now. 2. Start Topamax, titrate up to 50 mg daily for now. Can increase if tolerated. Side effects discussed. 3. Maxalt PRN. 4. Ubrelvy PRN samples given today. 5. Patient advised of the pathophysiology, etiology, and diagnosis of migraines, along with treatment options, and treatment side effects. 6. Return in about 3 months (around 12/16/2022) for Follow up, sooner with worsening symptoms.      Jessica Shaikh, APRN - CNP

## 2022-09-16 NOTE — PATIENT INSTRUCTIONS
Topamax  Week 1- 1/2 pill at bedtime  Week 2 and on- 1 pill at bedtime    Continue Amitriptyline for now  Continue Maxalt as needed  Ubrelvy samples given as well-use as needed    OTC Flonase nasal spray

## 2022-11-04 ENCOUNTER — HOSPITAL ENCOUNTER (OUTPATIENT)
Dept: WOMENS IMAGING | Age: 42
Discharge: HOME | End: 2022-11-04
Payer: COMMERCIAL

## 2022-11-04 VITALS — BODY MASS INDEX: 43.27 KG/M2 | WEIGHT: 229 LBS

## 2022-11-04 DIAGNOSIS — Z12.31 ENCOUNTER FOR SCREENING MAMMOGRAM FOR MALIGNANT NEOPLASM OF BREAST: ICD-10-CM

## 2022-11-04 PROCEDURE — 77067 SCR MAMMO BI INCL CAD: CPT

## 2023-02-02 ENCOUNTER — OFFICE VISIT (OUTPATIENT)
Dept: NEUROLOGY | Age: 43
End: 2023-02-02
Payer: COMMERCIAL

## 2023-02-02 VITALS
SYSTOLIC BLOOD PRESSURE: 131 MMHG | WEIGHT: 225 LBS | DIASTOLIC BLOOD PRESSURE: 92 MMHG | HEART RATE: 68 BPM | BODY MASS INDEX: 42.48 KG/M2 | HEIGHT: 61 IN | OXYGEN SATURATION: 99 %

## 2023-02-02 DIAGNOSIS — G43.009 MIGRAINE WITHOUT AURA AND WITHOUT STATUS MIGRAINOSUS, NOT INTRACTABLE: Primary | ICD-10-CM

## 2023-02-02 DIAGNOSIS — Z79.899 MEDICATION MANAGEMENT: ICD-10-CM

## 2023-02-02 PROCEDURE — 99214 OFFICE O/P EST MOD 30 MIN: CPT | Performed by: NURSE PRACTITIONER

## 2023-02-02 RX ORDER — ATOGEPANT 60 MG/1
60 TABLET ORAL DAILY
Qty: 30 TABLET | Refills: 3 | Status: SHIPPED | OUTPATIENT
Start: 2023-02-02

## 2023-02-02 RX ORDER — FLUOXETINE 10 MG/1
CAPSULE ORAL
COMMUNITY
Start: 2022-10-26

## 2023-02-02 RX ORDER — RIZATRIPTAN BENZOATE 10 MG/1
10 TABLET ORAL
Qty: 12 TABLET | Refills: 5 | Status: SHIPPED | OUTPATIENT
Start: 2023-02-02 | End: 2023-02-02

## 2023-02-02 NOTE — PROGRESS NOTES
[facility] NEUROLOGY:    Patient: Annie Ronquillo   :  1980  Age:  38 y.o. MRN:  [id number]  Today:  22    Provider: DAVID Paniagua    Chief Complaint:  Chief Complaint   Patient presents with    Follow-up    Migraine       PCP: DAVID Miller - CNP    HISTORY OF PRESENT ILLNESS:   Annie Ronquillo is a 38y.o. year old female here for 3 month follow up of migraines. She reports worsening of headaches, states they have been daily for last few weeks. Denies changes to headache characteristics. She was started on Topamax titration at prior visit had to stop this due to side effect issues including numbness and tingling. Also on amitriptyline 25 mg nightly, denies any clear benefit from this and does feel like it is sedating. She is still using rizatriptan as needed with a clear benefit but notes that lately she has been running out of this monthly. She does relate she has trouble with the weather change in the fall and this month can be triggering for headaches. Still notes pain is retro orbital or frontal with radiation globally. There is some pain to occipital area as well. She has associated nausea, photophobia and phonophobia. She has episodic left sided numbness as well that is not always associated with headaches. She does note that she had COVID in November and was unable to make her FU appointment. There is history of fibromyalgia.      Additional Relevant History:  History of head/neck trauma: No  History of head/neck surgery:  No  Family h/o headaches or aneurysm: No      PAST MEDICAL HISTORY:    Medical History:      Diagnosis Date    Dysphagia     Fibromyalgia     Migraines        Surgical History:      Procedure Laterality Date     SECTION  ,      SECTION      x3    CHOLECYSTECTOMY      [facility]    right, mass, normal    LEG SURGERY      TUBAL LIGATION  2013    UPPER GASTROINTESTINAL ENDOSCOPY  2020    [place], Dilation per patient     UPPER GASTROINTESTINAL ENDOSCOPY N/A 04/25/2022    Dr Stephanie Peterson, W 47 Fr dil, inlet patch in upper esoph,    UPPER GASTROINTESTINAL ENDOSCOPY  04/25/2022    Dr Layla Ellis 54 Fr bougie dilation    WISDOM TOOTH EXTRACTION Bilateral        Current Medications:  Current Outpatient Medications   Medication Sig Dispense Refill    FLUoxetine (PROZAC) 10 MG capsule TAKE 1 CAPSULE BY MOUTH ONCE DAILY      rizatriptan (MAXALT) 10 MG tablet Take 1 tablet by mouth once as needed for Migraine May repeat in 2 hours if needed 12 tablet 5    Atogepant (QULIPTA) 60 MG TABS Take 60 mg by mouth daily 30 tablet 3    omeprazole (PRILOSEC) 20 MG delayed release capsule Take 1 capsule by mouth Daily Take first thing in the morning on an empty stomach. 30 capsule 11     No current facility-administered medications for this visit. Allergies:  Patient has no known allergies.     SOCIAL HISTORY:   Social History     Socioeconomic History    Marital status:      Spouse name: Not on file    Number of children: Not on file    Years of education: Not on file    Highest education level: Not on file   Occupational History    Not on file   Tobacco Use    Smoking status: Never    Smokeless tobacco: Never   Vaping Use    Vaping Use: Never used   Substance and Sexual Activity    Alcohol use: No    Drug use: No    Sexual activity: Yes     Partners: Male   Other Topics Concern    Not on file   Social History Narrative    Not on file     Social Determinants of Health     Financial Resource Strain: Not on file   Food Insecurity: Not on file   Transportation Needs: Not on file   Physical Activity: Not on file   Stress: Not on file   Social Connections: Not on file   Intimate Partner Violence: Not on file   Housing Stability: Not on file       FAMILY HISTORY:       Problem Relation Age of Onset    High Blood Pressure Mother     Celiac Disease Mother     High Cholesterol Mother Diabetes Maternal Grandmother     Stroke Maternal Grandmother     Heart Attack Maternal Grandfather     Cancer Paternal Grandfather     Colon Cancer Neg Hx     Esophageal Cancer Neg Hx     Liver Cancer Neg Hx     Rectal Cancer Neg Hx     Stomach Cancer Neg Hx        REVIEW OF SYSTEMS:  Constitutional: []Fever []Sweats []Chills [] Recent Injury   [x] Denies all unless marked  HENT:[x]Headache  [] Head Injury  [] Sore Throat  [] Ear Pain  [] Dizziness [] Hearing Loss   [x] Denies all unless marked  Musculoskeletal: [] Arthralgia  [] Myalgias [] Muscle cramps  [] Muscle twitches   [x] Denies all unless marked   Spine:  [] Neck pain  [] Back pain  [] Sciaticia  [x] Denies all unless marked  Neurological:[] Visual Disturbance [] Double Vision [] Slurred Speech [] Trouble swallowing  [] Vertigo [] Tingling [] Numbness [] Weakness [] Loss of Balance   [] Loss of Consciousness [] Memory Loss [] Seizures  [x] Denies all unless marked  Psychiatric/Behavioral:[] Depression [] Anxiety  [x] Denies all unless marked  Sleep: []  Insomnia [] Sleep Disturbance [] Snoring [] Restless Legs [] Daytime Sleepiness [] Sleep Apnea  [x] Denies all unless marked    The MA has completed the ROS with the patient. I have reviewed it in its' entirety with the patient and agree with the documentation. PHYSICAL EXAMINATION:  Vitals: BP (!) 131/92   Pulse 68   Ht 5' 1\" (1.549 m)   Wt 225 lb (102.1 kg)   SpO2 99%   BMI 42.51 kg/m²     Constitutional - No acute distress    HEENT- Conjunctiva normal.  No scars, masses, or lesions over external nose or ears, no neck masses noted, no jugular vein distension, no bruit. TM's visualized with clear serous fluid bilaterally.    Cardiac- Regular rate and rhythm  Pulmonary- Clear to auscultation, good expansion, normal effort without use of accessory muscles  Musculoskeletal - No significant wasting of muscles noted, no bony deformities  Extremities - No clubbing, cyanosis or edema  Skin - Warm, dry, and intact. No rash, erythema, or pallor  Psychiatric - Mood, affect, and behavior appear normal          NEUROLOGIC EXAMINATION:    Mental status   [x]Awake, alert, oriented   [x]Affect attention and concentration appear appropriate  [x]Recent and remote memory appears unremarkable  [x]Speech normal without dysarthria or aphasia, comprehension and repetition intact. COMMENTS:    Cranial Nerves [x]No VF deficit to confrontation  [x]PERRLA, EOMI, no nystagmus, conjugate eye movements, no ptosis  [x]Face symmetric  [x]Facial sensation intact  [x]Tongue midline no atrophy or fasciculations present  [x]Palate midline, hearing to finger rub normal bilaterally  [x]Shoulder shrug and SCM testing normal bilaterally  COMMENTS:   Motor   [x]5/5 strength x 4 extremities  [x]Normal bulk and tone  [x]No tremor present  [x]No rigidity or bradykinesia noted  COMMENTS:   Sensory  [x]Sensation intact to light touch, pin prick, vibration, and proprioception BLE  [x]Sensation intact to light touch, pin prick, vibration, and proprioception BUE  COMMENTS:   Coordination [x]FTN normal bilaterally   [x]HTS normal bilaterally  [x]LATOYA normal bilaterally.    COMMENTS:   Reflexes  [x]Symmetric and non-pathological  [x]Toes down going bilaterally  [x]No clonus present  COMMENTS:   Gait                  [x]Normal steady gait    []Ataxic    []Spastic     []Magnetic     []Shuffling  COMMENTS:       ADDITIONAL REVIEW:  Lab Results   Component Value Date    AHMUIHWR70 549 03/24/2022     Lab Results   Component Value Date    WBC 11.1 (H) 03/24/2022    HGB 13.6 03/24/2022    HCT 43.1 03/24/2022    MCV 94.9 03/24/2022     (H) 03/24/2022     Lab Results   Component Value Date     03/24/2022    K 4.5 03/24/2022     03/24/2022    CO2 24 03/24/2022    BUN 24 (H) 03/24/2022    CREATININE 0.7 03/24/2022    GLUCOSE 102 03/24/2022    CALCIUM 9.6 03/24/2022    PROT 7.9 03/24/2022    PROT 7.8 03/24/2022    LABALBU 5.1 03/24/2022 LABALBU 4.31 03/24/2022    BILITOT <0.2 03/24/2022    ALKPHOS 69 03/24/2022    AST 13 03/24/2022    ALT 18 03/24/2022    LABGLOM >60 03/24/2022    GFRAA >59 03/24/2022     Lab Results   Component Value Date    TSH 0.922 01/09/2018     MRI Brain W WO 4/8/22  Narrative   Examination. MRI BRAIN W WO CONTRAST 4/8/2022 7:56 AM   History: Migraine. The multiplanar, multisequence MR imaging of the brain is performed   without intravenous contrast enhancement. There is no previous study for comparison. The correlation made with   CT scan of the head dated 10/10/2017. The diffusion-weighted imaging sequence including the ADC map show no   areas of restricted diffusion. .   There is no evidence of a mass. No midline shift. The ventricles, the basal cistern and the cortical sulci are normal.   The orbits, the optic nerves, the optic chiasm and optic tract   bilaterally normal and symmetrical.   The limited visualized intracranial nerves are normal and symmetrical.   The cochlea gland is normal. The corpus callosum, the brainstem and   cerebellum are normal.   There are no areas of abnormal focal or diffuse contrast enhancement. The FLAIR sequence images show a single small focus of T2 signal in   the left insular cortex/subcortical white matter in the posterior   aspect of the left sylvian cistern. No mass effect. It shows no   contrast enhancement. The remaining brain show no abnormal T2 signal   abnormalities. The gradient recalled imaging sequence show normal flow/flow void in   the limited visualized intracranial vessels. There is mucosal thickening involving the left maxillary antrum. The   mastoid air cells are clear. Impression   1. Small focus of nonspecific T2 signal abnormality in the left   temporal/insular subcortical white matter may be related to patient's   migraine headache. No mass. No abnormal enhancement. 2. The remaining brain is unremarkable. 3. Evidence of maxillary sinusitis. Signed by Dr Jayson Torres:  Hang Holley is a [address] female here today for follow up of migraines. She has had increase to migraines over the last few weeks- reports they are daily. No changes to characteristics. Is still taking Amitriptyline 25 mg nightly, no clear benefit, does feel it is sedating. Maxalt used PRN with abortive benefit. Jesus Steve was not covered by insurance although it was very beneficial for her. MRI showed T2 signal abnormality in left temporal/insular subcortical white matter consistent with migraine headaches. Prior labs were unremarkable. Exam non-focal. With allergy history there is most likely some sinusitis and serous otitis that can be playing a role in increased number of headaches. She has stopped Topamax due to issues with side effects. She is still on amitriptyline despite no clear migrainous benefit, and noted side effects. We will plan to discontinue amitriptyline. Propranolol is not felt appropriate for patient due to heart rate in the low 60s in office. Will trial Qulipta daily. Continue Maxalt PRN. ICD-10-CM    1. Migraine without aura and without status migrainosus, not intractable  G43.009 rizatriptan (MAXALT) 10 MG tablet     Atogepant (QULIPTA) 60 MG TABS          PLAN:  1. Stop Amitriptyline as discussed. 2. Start Qulipta daily- samples given today. Side effects discussed. 3. Continue Maxalt PRN. 4. Ubrelvy PRN samples given today as well. 5. Patient advised of the pathophysiology, etiology, and diagnosis of migraines, along with treatment options, and treatment side effects. 6. Return in about 3 months (around 5/2/2023) for Follow up, sooner with worsening symptoms.      Bryson Colonn, APRN - CNP

## 2023-02-02 NOTE — PROGRESS NOTES
REVIEW OF SYSTEMS    Constitutional: []Fever []Sweat []Chills [] Recent Injury [x] Denies all unless marked  HEENT:[x]Headache  [] Head Injury/Hearing Loss  [] Sore Throat  [] Ear Ache/Dizziness  [x] Denies all unless marked  Spine:  [x] Neck pain  [] Back pain  [] Sciaticia  [x] Denies all unless marked  Cardiovascular:[]Heart Disease []Chest Pain [] Palpitations  [x] Denies all unless marked  Pulmonary: []Shortness of Breath []Cough   [x] Denies all unless marke  Gastrointestinal: []Nausea  []Vomiting  []Abdominal Pain  []Constipation  []Diarrhea  []Dark Bloody Stools  [x] Denies all unless marked  Psychiatric/Behavioral:[] Depression [] Anxiety [x] Denies all unless marked  Genitourinary:   [] Frequency  [] Urgency  [] Incontinence [] Pain with Urination  [x] Denies all unless marked  Extremities: []Pain  []Swelling  [x] Denies all unless marked  Musculoskeletal: [x] Muscle Pain  [x] Joint Pain  [] Arthritis [] Muscle Cramps [] Muscle Twitches  [x] Denies all unless marked  Sleep: [] Insomnia [] Snoring [] Restless Legs [] Sleep Apnea  [] Daytime Sleepiness  [x] Denies all unless marked  Skin:[x] Rash [] Skin Discoloration [x] Denies all unless marked   Neurological: [x]Visual Disturbance/Memory Loss [] Loss of Balance [x] Slurred Speech/Weakness [] Seizures  [] Vertigo/Dizziness [x] Denies all unless marked

## 2023-02-03 ENCOUNTER — CLINICAL DOCUMENTATION (OUTPATIENT)
Dept: NEUROLOGY | Age: 43
End: 2023-02-03

## 2023-02-03 NOTE — PROGRESS NOTES
BONI HARRELL Ambrocio: [id number] - Rx #: [id number] help?  Call us at [phone]  Status  Sent to [employer]  Drug  Qulipta 60MG tablets  Form  [employer] Electronic PA Form (2921 NCPDP)  Original Claim Info  70,MR

## 2023-04-17 NOTE — PATIENT INSTRUCTIONS
stairs instead of the elevator. If you use tobacco or nicotine, try to quit. Ask your doctor about programs and medicines to help you quit. Limit alcohol. Men should have no more than 2 drinks a day. Women should have no more than 1. For some people, no alcohol is the best choice. Follow-up care is a key part of your treatment and safety. Be sure to make and go to all appointments, and call your doctor if you are having problems. It's also a good idea to know your test results and keep a list of the medicines you take. Where can you learn more? Go to http://www.cruz.com/ and enter U807 to learn more about \"A Healthy Lifestyle: Care Instructions. \"  Current as of: November 14, 2022               Content Version: 13.6  © 2006-2023 [employer]. Care instructions adapted under license by [facility] ([facility]). If you have questions about a medical condition or this instruction, always ask your healthcare professional. [address] any warranty or liability for your use of this information. Patient Education        Body Mass Index: Care Instructions  Your Care Instructions     Body mass index (BMI) can help you see if your weight is raising your risk for health problems. It uses a formula to compare how much you weigh with how tall you are. A BMI lower than 18.5 is considered underweight. A BMI between 18.5 and 24.9 is considered healthy. A BMI between 25 and 29.9 is considered overweight. A BMI of 30 or higher is considered obese. If your BMI is in the normal range, it means that you have a lower risk for weight-related health problems. If your BMI is in the overweight or obese range, you may be at increased risk for weight-related health problems, such as high blood pressure, heart disease, stroke, arthritis or joint pain, and diabetes.  If your BMI is in the underweight range, you may be at increased risk for health problems such as fatigue, lower

## 2023-04-18 ENCOUNTER — OFFICE VISIT (OUTPATIENT)
Dept: OBGYN CLINIC | Age: 43
End: 2023-04-18
Payer: COMMERCIAL

## 2023-04-18 VITALS
BODY MASS INDEX: 43.99 KG/M2 | WEIGHT: 233 LBS | DIASTOLIC BLOOD PRESSURE: 78 MMHG | HEIGHT: 61 IN | SYSTOLIC BLOOD PRESSURE: 118 MMHG

## 2023-04-18 DIAGNOSIS — Z12.4 ENCOUNTER FOR SCREENING FOR CERVICAL CANCER: ICD-10-CM

## 2023-04-18 DIAGNOSIS — Z11.51 ENCOUNTER FOR SCREENING FOR HUMAN PAPILLOMAVIRUS (HPV): ICD-10-CM

## 2023-04-18 DIAGNOSIS — Z12.31 ENCOUNTER FOR SCREENING MAMMOGRAM FOR MALIGNANT NEOPLASM OF BREAST: ICD-10-CM

## 2023-04-18 DIAGNOSIS — N90.89 LABIAL IRRITATION: ICD-10-CM

## 2023-04-18 DIAGNOSIS — Z76.89 ENCOUNTER TO ESTABLISH CARE: Primary | ICD-10-CM

## 2023-04-18 DIAGNOSIS — Z01.419 ENCOUNTER FOR CERVICAL PAP SMEAR WITH PELVIC EXAM: ICD-10-CM

## 2023-04-18 PROCEDURE — 99396 PREV VISIT EST AGE 40-64: CPT | Performed by: ADVANCED PRACTICE MIDWIFE

## 2023-04-18 RX ORDER — ONDANSETRON 4 MG/1
TABLET, ORALLY DISINTEGRATING ORAL
COMMUNITY
Start: 2023-04-11 | End: 2023-04-18 | Stop reason: ALTCHOICE

## 2023-04-18 RX ORDER — CLOTRIMAZOLE AND BETAMETHASONE DIPROPIONATE 10; .64 MG/G; MG/G
CREAM TOPICAL
Qty: 15 G | Refills: 1 | Status: SHIPPED | OUTPATIENT
Start: 2023-04-18

## 2023-04-18 RX ORDER — SEMAGLUTIDE 0.5 MG/.5ML
INJECTION, SOLUTION SUBCUTANEOUS
COMMUNITY
Start: 2023-04-12

## 2023-04-18 NOTE — PROGRESS NOTES
[facility]  CNM Office Note    Zulema Mina is a 38 y.o. female who presents today for her medical conditions/ complaints as noted below. Chief Complaint   Patient presents with    New Patient     She has never seen Mallory before, has never been here before. Hasn't been in a while. She used to see Dr. Johanny Hartman until he retired then saw Dr. Joseph Stanley and it wasn't a good fit. Establish Care    Gynecologic Exam         HPI  Merlin Mallow presents for annual gyn exam. She is doing well without complaints. She has had ablation and does not have periods. No sexual concerns. C/o frequent \"irritation\" above clitoris on external genitalia. Last mammogram: 2022- normal  Last pap smear: \"it's been 7-8 years, negative\"   Sexually active: Yes  Sexual preference: Male  Contraception: TL and ablation  : 2  Para: 2  AB: 0  Last bone density: never   Last colonoscopy: never  Menarche: 17years old  LMP:   Menses: no spotting or bleeding  Problems/Complaints today:  1. Encounter to establish care  2. Encounter for cervical Pap smear with pelvic exam  -     PAP SMEAR  3. Encounter for screening for human papillomavirus (HPV)  -     Human papillomavirus (HPV) DNA Probe Thin Prep High Risk  4. Encounter for screening for cervical cancer  -     PAP SMEAR  -     Human papillomavirus (HPV) DNA Probe Thin Prep High Risk  5. Encounter for screening mammogram for malignant neoplasm of breast  -     RENAE DIGITAL SCREEN W OR WO CAD BILATERAL; Future  6. Labial irritation       Patient Active Problem List   Diagnosis    Weakness       No LMP recorded (lmp unknown). Patient has had an ablation.   [id number]    Past Medical History:   Diagnosis Date    Dysphagia     Fibromyalgia     Migraines      Past Surgical History:   Procedure Laterality Date     SECTION  ,     CHOLECYSTECTOMY  2009    ENDOMETRIAL ABLATION      ESOPHAGEAL DILATATION      LEG SURGERY      right, mass, normal    LEG SURGERY

## 2023-04-18 NOTE — PROGRESS NOTES
Pt presents today for pap smear and breast exam.     Last mammogram: 2022- normal  Last pap smear: \"it's been 7-8 years, negative\"   Sexually active: Yes  Sexual preference: Male  Contraception: TL and ablation  : 2  Para: 2  AB: 0  Last bone density: never   Last colonoscopy: never  Menarche: 17years old  LMP:   Menses: no spotting or bleeding

## 2023-04-21 LAB
HPV HR 12 DNA SPEC QL NAA+PROBE: NOT DETECTED
HPV16 DNA SPEC QL NAA+PROBE: NOT DETECTED
HPV16+18+H RISK 12 DNA SPEC-IMP: NORMAL
HPV18 DNA SPEC QL NAA+PROBE: NOT DETECTED

## 2023-04-24 ENCOUNTER — CLINICAL DOCUMENTATION (OUTPATIENT)
Dept: NEUROLOGY | Age: 43
End: 2023-04-24

## 2023-04-24 NOTE — PROGRESS NOTES
BONI HARRELL Key: [id number] - PA Case ID: [id number] - Rx #: [id number] help?  Call us at [phone]  Status  Sent to [employer]  Drug  Qulipta 60MG tablets  Form  [employer] Electronic PA Form (1881 NCPDP)  Original Claim Info  70,MR well appearing

## 2023-04-24 NOTE — PROGRESS NOTES
BONI Littlejohn Debar - Rx #: [id number] help?  Call us at [phone]  Status  Sent to [employer]  Drug  Ubrelvy 100MG tablets  Form  [place] [address]

## 2023-05-02 ENCOUNTER — OFFICE VISIT (OUTPATIENT)
Dept: NEUROLOGY | Age: 43
End: 2023-05-02
Payer: COMMERCIAL

## 2023-05-02 VITALS
SYSTOLIC BLOOD PRESSURE: 105 MMHG | WEIGHT: 233 LBS | OXYGEN SATURATION: 98 % | HEIGHT: 61 IN | DIASTOLIC BLOOD PRESSURE: 66 MMHG | HEART RATE: 72 BPM | BODY MASS INDEX: 43.99 KG/M2

## 2023-05-02 DIAGNOSIS — Z79.899 MEDICATION MANAGEMENT: ICD-10-CM

## 2023-05-02 DIAGNOSIS — G43.009 MIGRAINE WITHOUT AURA AND WITHOUT STATUS MIGRAINOSUS, NOT INTRACTABLE: Primary | ICD-10-CM

## 2023-05-02 PROCEDURE — 99214 OFFICE O/P EST MOD 30 MIN: CPT | Performed by: NURSE PRACTITIONER

## 2023-05-02 RX ORDER — RIZATRIPTAN BENZOATE 10 MG/1
10 TABLET ORAL
Qty: 12 TABLET | Refills: 5 | Status: SHIPPED | OUTPATIENT
Start: 2023-05-02 | End: 2023-05-02

## 2023-05-02 RX ORDER — GALCANEZUMAB 120 MG/ML
120 INJECTION, SOLUTION SUBCUTANEOUS
Qty: 1 ADJUSTABLE DOSE PRE-FILLED PEN SYRINGE | Refills: 5 | Status: SHIPPED | OUTPATIENT
Start: 2023-05-02

## 2023-05-02 RX ORDER — ONDANSETRON 4 MG/1
TABLET, ORALLY DISINTEGRATING ORAL
COMMUNITY
Start: 2023-04-11

## 2023-05-02 RX ORDER — UBROGEPANT 100 MG/1
TABLET ORAL
Qty: 10 TABLET | Refills: 3 | Status: SHIPPED | OUTPATIENT
Start: 2023-05-02

## 2023-05-02 NOTE — PROGRESS NOTES
[facility] NEUROLOGY:    Patient: Luis Nye   :  1980  Age:  38 y.o. MRN:  [id number]  Today:  22    Provider: DAVID Maldonado    Chief Complaint:  Chief Complaint   Patient presents with    Follow-up    Migraine       PCP: DAVID Sanderson - CNP    HISTORY OF PRESENT ILLNESS:   Luis Nye is a 38y.o. year old female here for follow up of migraines. She is doing well overall, at previous visit we stopped the amitriptyline and started her on Arland Bogus. She reports the Arland Bogus was working but insurance denied refill. Over the past months she has had exacerbation of migraines. No changes to headache characteristics. In the past she was also started on Topamax but we discontinued this due to side effect issues including numbness and tingling. Amitriptyline was sedating for her. She is using rizatriptan with benefit, has tried Imitrex in the past as well as side effect issues Still notes pain is retro orbital or frontal with radiation globally. There is some pain to occipital area as well. She has associated nausea, photophobia and phonophobia. She has episodic left sided numbness as well that is not always associated with headaches. She does note that she had COVID in November and was unable to make her FU appointment. There is history of fibromyalgia.      Additional Relevant History:  History of head/neck trauma: No  History of head/neck surgery:  No  Family h/o headaches or aneurysm: No      PAST MEDICAL HISTORY:    Medical History:      Diagnosis Date    Dysphagia     Fibromyalgia     Migraines        Surgical History:      Procedure Laterality Date     SECTION  ,     CHOLECYSTECTOMY  2009    ENDOMETRIAL ABLATION      [address]    right, mass, normal    LEG SURGERY      TUBAL LIGATION      UPPER GASTROINTESTINAL ENDOSCOPY      [facility] per patient     UPPER GASTROINTESTINAL ENDOSCOPY N/A 2022

## 2023-05-02 NOTE — PROGRESS NOTES
REVIEW OF SYSTEMS    Constitutional: []Fever []Sweat []Chills [] Recent Injury [x] Denies all unless marked  HEENT:[]Headache  [] Head Injury/Hearing Loss  [] Sore Throat  [] Ear Ache/Dizziness  [x] Denies all unless marked  Spine:  [x] Neck pain  [x] Back pain  [] Sciaticia  [x] Denies all unless marked  Cardiovascular:[]Heart Disease []Chest Pain [] Palpitations  [x] Denies all unless marked  Pulmonary: []Shortness of Breath []Cough   [x] Denies all unless marke  Gastrointestinal: []Nausea  []Vomiting  []Abdominal Pain  []Constipation  []Diarrhea  []Dark Bloody Stools  [x] Denies all unless marked  Psychiatric/Behavioral:[] Depression [] Anxiety [x] Denies all unless marked  Genitourinary:   [] Frequency  [] Urgency  [] Incontinence [] Pain with Urination  [x] Denies all unless marked  Extremities: []Pain  []Swelling  [x] Denies all unless marked  Musculoskeletal: [] Muscle Pain  [] Joint Pain  [] Arthritis [] Muscle Cramps [] Muscle Twitches  [x] Denies all unless marked  Sleep: [x] Insomnia [] Snoring [] Restless Legs [] Sleep Apnea  [] Daytime Sleepiness  [x] Denies all unless marked  Skin:[x] Rash [] Skin Discoloration [x] Denies all unless marked   Neurological: []Visual Disturbance/Memory Loss [] Loss of Balance [] Slurred Speech/Weakness [] Seizures  [] Vertigo/Dizziness [x] Denies all unless marked

## 2023-05-02 NOTE — PROGRESS NOTES
After obtaining consent, and per orders of [employer], loading dose injection of Emgailty given first 120 mg given Right lower quad. Abdomnen. Second 120 mg given in left lower quad abdomen by Baila Games, MA. Patient instructed to remain in clinic for 20 minutes afterwards, and to report any adverse reaction to me immediately. Patient tolerated and left the clinic in no distress.

## 2023-09-05 ENCOUNTER — OFFICE VISIT (OUTPATIENT)
Dept: NEUROLOGY | Age: 43
End: 2023-09-05
Payer: COMMERCIAL

## 2023-09-05 VITALS
DIASTOLIC BLOOD PRESSURE: 74 MMHG | WEIGHT: 233 LBS | HEART RATE: 77 BPM | BODY MASS INDEX: 43.99 KG/M2 | SYSTOLIC BLOOD PRESSURE: 122 MMHG | HEIGHT: 61 IN | OXYGEN SATURATION: 98 %

## 2023-09-05 DIAGNOSIS — G43.009 MIGRAINE WITHOUT AURA AND WITHOUT STATUS MIGRAINOSUS, NOT INTRACTABLE: Primary | ICD-10-CM

## 2023-09-05 DIAGNOSIS — R20.2 NUMBNESS AND TINGLING: ICD-10-CM

## 2023-09-05 DIAGNOSIS — R41.3 MEMORY CHANGE: ICD-10-CM

## 2023-09-05 DIAGNOSIS — R20.0 NUMBNESS AND TINGLING: ICD-10-CM

## 2023-09-05 PROCEDURE — 99214 OFFICE O/P EST MOD 30 MIN: CPT | Performed by: PSYCHIATRY & NEUROLOGY

## 2023-09-05 RX ORDER — ONDANSETRON 4 MG/1
1 TABLET, ORALLY DISINTEGRATING ORAL PRN
COMMUNITY
Start: 2023-04-11

## 2023-09-05 RX ORDER — SEMAGLUTIDE 1.7 MG/.75ML
INJECTION, SOLUTION SUBCUTANEOUS WEEKLY
COMMUNITY
Start: 2023-08-05

## 2023-09-05 NOTE — PROGRESS NOTES
03/24/2022    MCV 94.9 03/24/2022     (H) 03/24/2022     Lab Results   Component Value Date     03/24/2022    K 4.5 03/24/2022     03/24/2022    CO2 24 03/24/2022    BUN 24 (H) 03/24/2022    CREATININE 0.7 03/24/2022    GLUCOSE 102 03/24/2022    CALCIUM 9.6 03/24/2022    PROT 7.9 03/24/2022    PROT 7.8 03/24/2022    LABALBU 5.1 03/24/2022    LABALBU 4.31 03/24/2022    BILITOT <0.2 03/24/2022    ALKPHOS 69 03/24/2022    AST 13 03/24/2022    ALT 18 03/24/2022    LABGLOM >60 03/24/2022    GFRAA >59 03/24/2022         ASSESSMENT:    Noemí Yepez is a 41y.o. year old female here for headaches and multiple complaints,  shaking, tremors, trouble swallowing, weakness, dizziness, balance difficulty, slurred speech, vision problems, memory difficulty, and numbness and tingling in the arms, and possible syncopal events. Prior MRI Brain and C-spine was largely normal.  Small non-specific white matter changes in the left temporal region, likely migraine related. Labs, ECHO, and NCS/EMG normal as well. Overall improved since last seen. PLAN:  1. Repeat MRI if worsens clinically, follow for now. 2.  Hold off on LP given largely negative imaging, MS felt unlikely currently. 3.  Off Cymbalta   4. Continue Silva Andujar, unable to get emgality through insurance, consider alternative preventative if worsens.        Peg Tejada, DO  Board Certified Neurology

## 2023-11-21 ENCOUNTER — HOSPITAL ENCOUNTER (OUTPATIENT)
Dept: WOMENS IMAGING | Age: 43
Discharge: HOME | End: 2023-11-21
Attending: ADVANCED PRACTICE MIDWIFE
Payer: COMMERCIAL

## 2023-11-21 DIAGNOSIS — Z12.31 ENCOUNTER FOR SCREENING MAMMOGRAM FOR MALIGNANT NEOPLASM OF BREAST: ICD-10-CM

## 2023-11-21 PROCEDURE — 77063 BREAST TOMOSYNTHESIS BI: CPT

## 2023-12-28 ENCOUNTER — APPOINTMENT (OUTPATIENT)
Dept: GENERAL RADIOLOGY | Age: 43
End: 2023-12-28
Payer: COMMERCIAL

## 2023-12-28 ENCOUNTER — HOSPITAL ENCOUNTER (EMERGENCY)
Age: 43
Discharge: HOME | End: 2023-12-28
Attending: EMERGENCY MEDICINE
Payer: COMMERCIAL

## 2023-12-28 VITALS
BODY MASS INDEX: 40.25 KG/M2 | OXYGEN SATURATION: 96 % | RESPIRATION RATE: 16 BRPM | TEMPERATURE: 98.9 F | DIASTOLIC BLOOD PRESSURE: 73 MMHG | HEART RATE: 88 BPM | WEIGHT: 213 LBS | SYSTOLIC BLOOD PRESSURE: 118 MMHG

## 2023-12-28 DIAGNOSIS — B33.8 RESPIRATORY SYNCYTIAL VIRUS (RSV): Primary | ICD-10-CM

## 2023-12-28 DIAGNOSIS — J40 BRONCHITIS: ICD-10-CM

## 2023-12-28 LAB
ALBUMIN SERPL-MCNC: 4.6 G/DL (ref 3.5–5.2)
ALP SERPL-CCNC: 75 U/L (ref 35–104)
ALT SERPL-CCNC: 17 U/L (ref 5–33)
ANION GAP SERPL CALCULATED.3IONS-SCNC: 12 MMOL/L (ref 7–19)
AST SERPL-CCNC: 13 U/L (ref 5–32)
B PARAP IS1001 DNA NPH QL NAA+NON-PROBE: NOT DETECTED
B PERT.PT PRMT NPH QL NAA+NON-PROBE: NOT DETECTED
BASOPHILS # BLD: 0.1 K/UL (ref 0–0.2)
BASOPHILS NFR BLD: 0.4 % (ref 0–1)
BILIRUB SERPL-MCNC: 0.4 MG/DL (ref 0.2–1.2)
BNP BLD-MCNC: <36 PG/ML (ref 0–124)
BUN SERPL-MCNC: 15 MG/DL (ref 6–20)
C PNEUM DNA NPH QL NAA+NON-PROBE: NOT DETECTED
CALCIUM SERPL-MCNC: 9.5 MG/DL (ref 8.6–10)
CHLORIDE SERPL-SCNC: 102 MMOL/L (ref 98–111)
CO2 SERPL-SCNC: 25 MMOL/L (ref 22–29)
CREAT SERPL-MCNC: 0.7 MG/DL (ref 0.5–0.9)
EKG P AXIS: 55 DEGREES
EKG P-R INTERVAL: 128 MS
EKG Q-T INTERVAL: 296 MS
EKG QRS DURATION: 92 MS
EKG QTC CALCULATION (BAZETT): 418 MS
EKG T AXIS: 40 DEGREES
EOSINOPHIL # BLD: 0.1 K/UL (ref 0–0.6)
EOSINOPHIL NFR BLD: 0.8 % (ref 0–5)
ERYTHROCYTE [DISTWIDTH] IN BLOOD BY AUTOMATED COUNT: 12.3 % (ref 11.5–14.5)
FLUAV RNA NPH QL NAA+NON-PROBE: NOT DETECTED
FLUBV RNA NPH QL NAA+NON-PROBE: NOT DETECTED
GLUCOSE SERPL-MCNC: 98 MG/DL (ref 74–109)
HADV DNA NPH QL NAA+NON-PROBE: NOT DETECTED
HCG SERPL QL: NEGATIVE
HCOV 229E RNA NPH QL NAA+NON-PROBE: NOT DETECTED
HCOV HKU1 RNA NPH QL NAA+NON-PROBE: NOT DETECTED
HCOV NL63 RNA NPH QL NAA+NON-PROBE: NOT DETECTED
HCOV OC43 RNA NPH QL NAA+NON-PROBE: NOT DETECTED
HCT VFR BLD AUTO: 45.1 % (ref 37–47)
HGB BLD-MCNC: 15.2 G/DL (ref 12–16)
HMPV RNA NPH QL NAA+NON-PROBE: NOT DETECTED
HPIV1 RNA NPH QL NAA+NON-PROBE: NOT DETECTED
HPIV2 RNA NPH QL NAA+NON-PROBE: NOT DETECTED
HPIV3 RNA NPH QL NAA+NON-PROBE: NOT DETECTED
HPIV4 RNA NPH QL NAA+NON-PROBE: NOT DETECTED
IMM GRANULOCYTES # BLD: 0 K/UL
LYMPHOCYTES # BLD: 1.6 K/UL (ref 1.1–4.5)
LYMPHOCYTES NFR BLD: 13.5 % (ref 20–40)
M PNEUMO DNA NPH QL NAA+NON-PROBE: NOT DETECTED
MCH RBC QN AUTO: 32.1 PG (ref 27–31)
MCHC RBC AUTO-ENTMCNC: 33.7 G/DL (ref 33–37)
MCV RBC AUTO: 95.1 FL (ref 81–99)
MONOCYTES # BLD: 1.2 K/UL (ref 0–0.9)
MONOCYTES NFR BLD: 10.2 % (ref 0–10)
NEUTROPHILS # BLD: 8.8 K/UL (ref 1.5–7.5)
NEUTS SEG NFR BLD: 74.8 % (ref 50–65)
PLATELET # BLD AUTO: 344 K/UL (ref 130–400)
PMV BLD AUTO: 9.2 FL (ref 9.4–12.3)
POTASSIUM SERPL-SCNC: 3.9 MMOL/L (ref 3.5–5)
PROT SERPL-MCNC: 8 G/DL (ref 6.6–8.7)
RBC # BLD AUTO: 4.74 M/UL (ref 4.2–5.4)
RSV RNA NPH QL NAA+NON-PROBE: DETECTED
RV+EV RNA NPH QL NAA+NON-PROBE: NOT DETECTED
SARS-COV-2 RNA NPH QL NAA+NON-PROBE: NOT DETECTED
SODIUM SERPL-SCNC: 139 MMOL/L (ref 136–145)
TROPONIN, HIGH SENSITIVITY: <6 NG/L (ref 0–14)
WBC # BLD AUTO: 11.8 K/UL (ref 4.8–10.8)

## 2023-12-28 PROCEDURE — 36415 COLL VENOUS BLD VENIPUNCTURE: CPT

## 2023-12-28 PROCEDURE — 93005 ELECTROCARDIOGRAM TRACING: CPT | Performed by: EMERGENCY MEDICINE

## 2023-12-28 PROCEDURE — 0202U NFCT DS 22 TRGT SARS-COV-2: CPT

## 2023-12-28 PROCEDURE — 6360000002 HC RX W HCPCS: Performed by: EMERGENCY MEDICINE

## 2023-12-28 PROCEDURE — 83880 ASSAY OF NATRIURETIC PEPTIDE: CPT

## 2023-12-28 PROCEDURE — 84703 CHORIONIC GONADOTROPIN ASSAY: CPT

## 2023-12-28 PROCEDURE — 80053 COMPREHEN METABOLIC PANEL: CPT

## 2023-12-28 PROCEDURE — 96374 THER/PROPH/DIAG INJ IV PUSH: CPT

## 2023-12-28 PROCEDURE — 2580000003 HC RX 258: Performed by: EMERGENCY MEDICINE

## 2023-12-28 PROCEDURE — 93010 ELECTROCARDIOGRAM REPORT: CPT | Performed by: INTERNAL MEDICINE

## 2023-12-28 PROCEDURE — 85025 COMPLETE CBC W/AUTO DIFF WBC: CPT

## 2023-12-28 PROCEDURE — 71045 X-RAY EXAM CHEST 1 VIEW: CPT

## 2023-12-28 PROCEDURE — 99285 EMERGENCY DEPT VISIT HI MDM: CPT

## 2023-12-28 PROCEDURE — 84484 ASSAY OF TROPONIN QUANT: CPT

## 2023-12-28 PROCEDURE — 96361 HYDRATE IV INFUSION ADD-ON: CPT

## 2023-12-28 RX ORDER — 0.9 % SODIUM CHLORIDE 0.9 %
1000 INTRAVENOUS SOLUTION INTRAVENOUS ONCE
Status: COMPLETED | OUTPATIENT
Start: 2023-12-28 | End: 2023-12-28

## 2023-12-28 RX ORDER — DEXTROMETHORPHAN HYDROBROMIDE AND PROMETHAZINE HYDROCHLORIDE 15; 6.25 MG/5ML; MG/5ML
5 SYRUP ORAL EVERY 8 HOURS PRN
Qty: 105 ML | Refills: 0 | Status: SHIPPED | OUTPATIENT
Start: 2023-12-28 | End: 2024-01-04

## 2023-12-28 RX ORDER — BENZONATATE 100 MG/1
100 CAPSULE ORAL 3 TIMES DAILY PRN
Qty: 30 CAPSULE | Refills: 0 | Status: SHIPPED | OUTPATIENT
Start: 2023-12-28 | End: 2024-01-07

## 2023-12-28 RX ORDER — KETOROLAC TROMETHAMINE 30 MG/ML
30 INJECTION, SOLUTION INTRAMUSCULAR; INTRAVENOUS ONCE
Status: COMPLETED | OUTPATIENT
Start: 2023-12-28 | End: 2023-12-28

## 2023-12-28 RX ADMIN — KETOROLAC TROMETHAMINE 30 MG: 30 INJECTION, SOLUTION INTRAMUSCULAR; INTRAVENOUS at 03:00

## 2023-12-28 RX ADMIN — SODIUM CHLORIDE 1000 ML: 9 INJECTION, SOLUTION INTRAVENOUS at 03:00

## 2023-12-28 NOTE — ED PROVIDER NOTES
edema.   Skin:     General: Skin is warm and dry. Neurological:      Mental Status: She is alert and oriented to person, place, and time. GCS: GCS eye subscore is 4. GCS verbal subscore is 5. GCS motor subscore is 6. DIAGNOSTIC RESULTS     EKG: All EKG's areinterpreted by the Emergency Department Physician who either signs or Co-signs this chart in the absence of a cardiologist.    130 sinus tachycardia no obvious ST changes QTc 449 nondiagnostic EKG    RADIOLOGY:  Non-plain film images such as CT, Ultrasound and MRI are read by the radiologist. Plain radiographic images are visualized and preliminarily interpreted bythe emergency physician with the below findings:      XR CHEST PORTABLE   Final Result   Impression:  Cardiomegaly. ______________________________________    Electronically signed by: Annamaria Haynes M.D. Date:     12/28/2023   Time:    03:40         No acute disease      LABS:  Labs Reviewed   RESPIRATORY PANEL, MOLECULAR, WITH COVID-19 - Abnormal; Notable for the following components:       Result Value    Respiratory Syncytial Virus by PCR DETECTED (*)     All other components within normal limits   CBC WITH AUTO DIFFERENTIAL - Abnormal; Notable for the following components:    WBC 11.8 (*)     MCH 32.1 (*)     MPV 9.2 (*)     Neutrophils % 74.8 (*)     Lymphocytes % 13.5 (*)     Monocytes % 10.2 (*)     Neutrophils Absolute 8.8 (*)     Monocytes Absolute 1.20 (*)     All other components within normal limits   COMPREHENSIVE METABOLIC PANEL   TROPONIN   BRAIN NATRIURETIC PEPTIDE   HCG, SERUM, QUALITATIVE       All other labs were within normal range or not returned as of this dictation.     EMERGENCY DEPARTMENT COURSE and DIFFERENTIAL DIAGNOSIS/MDM:   Vitals:    Vitals:    12/28/23 0245 12/28/23 0300 12/28/23 0315 12/28/23 0423   BP:       Pulse: (!) 112 (!) 115 (!) 108 88   Resp: 21 14 20 16   Temp:       TempSrc:       SpO2: 96% 93% 95% 96%   Weight:           MDM

## 2024-03-05 ENCOUNTER — OFFICE VISIT (OUTPATIENT)
Dept: NEUROLOGY | Age: 44
End: 2024-03-05
Payer: COMMERCIAL

## 2024-03-05 VITALS
HEART RATE: 77 BPM | OXYGEN SATURATION: 97 % | BODY MASS INDEX: 40.22 KG/M2 | SYSTOLIC BLOOD PRESSURE: 102 MMHG | HEIGHT: 61 IN | WEIGHT: 213 LBS | DIASTOLIC BLOOD PRESSURE: 69 MMHG

## 2024-03-05 DIAGNOSIS — Z79.899 MEDICATION MANAGEMENT: ICD-10-CM

## 2024-03-05 DIAGNOSIS — G43.009 MIGRAINE WITHOUT AURA AND WITHOUT STATUS MIGRAINOSUS, NOT INTRACTABLE: Primary | ICD-10-CM

## 2024-03-05 PROCEDURE — 99214 OFFICE O/P EST MOD 30 MIN: CPT | Performed by: NURSE PRACTITIONER

## 2024-03-05 RX ORDER — ERENUMAB-AOOE 70 MG/ML
70 INJECTION SUBCUTANEOUS
Qty: 1 ADJUSTABLE DOSE PRE-FILLED PEN SYRINGE | Refills: 5 | Status: SHIPPED | OUTPATIENT
Start: 2024-03-05

## 2024-03-05 NOTE — PROGRESS NOTES
REVIEW OF SYSTEMS    Constitutional: []Fever []Sweat []Chills [] Recent Injury [x] Denies all unless marked  HEENT:[x]Headache  [] Head Injury/Hearing Loss  [] Sore Throat  [] Ear Ache/Dizziness  [x] Denies all unless marked  Spine:  [] Neck pain  [] Back pain  [] Sciaticia  [x] Denies all unless marked  Cardiovascular:[]Heart Disease []Chest Pain [] Palpitations  [x] Denies all unless marked  Pulmonary: []Shortness of Breath []Cough   [x] Denies all unless marke  Gastrointestinal: []Nausea  []Vomiting  []Abdominal Pain  []Constipation  []Diarrhea  []Dark Bloody Stools  [x] Denies all unless marked  Psychiatric/Behavioral:[] Depression [] Anxiety [x] Denies all unless marked  Genitourinary:   [] Frequency  [] Urgency  [] Incontinence [] Pain with Urination  [x] Denies all unless marked  Extremities: []Pain  []Swelling  [x] Denies all unless marked  Musculoskeletal: [] Muscle Pain  [] Joint Pain  [] Arthritis [] Muscle Cramps [] Muscle Twitches  [x] Denies all unless marked  Sleep: [] Insomnia [] Snoring [] Restless Legs [] Sleep Apnea  [] Daytime Sleepiness  [x] Denies all unless marked  Skin:[] Rash [] Skin Discoloration [x] Denies all unless marked   Neurological: [x]Visual Disturbance/Memory Loss [x] Loss of Balance [] Slurred Speech/Weakness [] Seizures  [x] Vertigo/Dizziness [x] Denies all unless marked       
4/8/22  Narrative   Examination. MRI BRAIN W WO CONTRAST 4/8/2022 7:56 AM   History: Migraine.   The multiplanar, multisequence MR imaging of the brain is performed   without intravenous contrast enhancement.   There is no previous study for comparison. The correlation made with   CT scan of the head dated 10/10/2017.   The diffusion-weighted imaging sequence including the ADC map show no   areas of restricted diffusion..   There is no evidence of a mass. No midline shift.   The ventricles, the basal cistern and the cortical sulci are normal.   The orbits, the optic nerves, the optic chiasm and optic tract   bilaterally normal and symmetrical.   The limited visualized intracranial nerves are normal and symmetrical.   The cochlea gland is normal. The corpus callosum, the brainstem and   cerebellum are normal.   There are no areas of abnormal focal or diffuse contrast enhancement.   The FLAIR sequence images show a single small focus of T2 signal in   the left insular cortex/subcortical white matter in the posterior   aspect of the left sylvian cistern. No mass effect. It shows no   contrast enhancement. The remaining brain show no abnormal T2 signal   abnormalities.   The gradient recalled imaging sequence show normal flow/flow void in   the limited visualized intracranial vessels.   There is mucosal thickening involving the left maxillary antrum. The   mastoid air cells are clear.       Impression   1. Small focus of nonspecific T2 signal abnormality in the left   temporal/insular subcortical white matter may be related to patient's   migraine headache. No mass. No abnormal enhancement.   2. The remaining brain is unremarkable.   3. Evidence of maxillary sinusitis.   Signed by Dr Aguila Khan       IMPRESSION:  Jolly Zhu is a 43 y.o. female here today for follow up of migraines. Migraines are worsening over time. Having increase in frequency and severity. Has recently had covid infection with frontal

## 2024-03-06 ENCOUNTER — TELEPHONE (OUTPATIENT)
Dept: NEUROLOGY | Age: 44
End: 2024-03-06

## 2024-03-06 NOTE — TELEPHONE ENCOUNTER
Jolly Zhu (Key: [id number])  PA Case ID #: [id number]  Rx #: [id number]  Need Help? Call us at [phone]  Outcome  Denied today  Your PA request has been denied. Additional information will be provided in the denial communication. (Message 1140)  Drug  Aimovig 70MG/ML auto-injectors    Form  [employer] Electronic PA Form (2017 NCPDP)  Original Claim Info  70,MR *[id number]* Preferred products are: [id number] - EMGALITY INJ 120MG/ML, [id number] - EMGALITY INJ 120MG/ML

## 2024-03-28 DIAGNOSIS — G43.009 MIGRAINE WITHOUT AURA AND WITHOUT STATUS MIGRAINOSUS, NOT INTRACTABLE: Primary | ICD-10-CM

## 2024-03-28 DIAGNOSIS — Z79.899 MEDICATION MANAGEMENT: ICD-10-CM

## 2024-03-28 RX ORDER — ATOGEPANT 60 MG/1
60 TABLET ORAL DAILY
Qty: 30 TABLET | Refills: 3 | Status: SHIPPED | OUTPATIENT
Start: 2024-03-28

## 2024-04-22 ASSESSMENT — PATIENT HEALTH QUESTIONNAIRE - PHQ9
SUM OF ALL RESPONSES TO PHQ9 QUESTIONS 1 & 2: 0
SUM OF ALL RESPONSES TO PHQ QUESTIONS 1-9: 0
SUM OF ALL RESPONSES TO PHQ QUESTIONS 1-9: 0
1. LITTLE INTEREST OR PLEASURE IN DOING THINGS: NOT AT ALL
SUM OF ALL RESPONSES TO PHQ QUESTIONS 1-9: 0
2. FEELING DOWN, DEPRESSED OR HOPELESS: NOT AT ALL
SUM OF ALL RESPONSES TO PHQ QUESTIONS 1-9: 0

## 2024-04-23 ENCOUNTER — OFFICE VISIT (OUTPATIENT)
Dept: OBGYN CLINIC | Age: 44
End: 2024-04-23

## 2024-04-23 VITALS
SYSTOLIC BLOOD PRESSURE: 108 MMHG | HEIGHT: 61 IN | BODY MASS INDEX: 39.84 KG/M2 | WEIGHT: 211 LBS | DIASTOLIC BLOOD PRESSURE: 72 MMHG

## 2024-04-23 DIAGNOSIS — Z12.4 ENCOUNTER FOR SCREENING FOR CERVICAL CANCER: ICD-10-CM

## 2024-04-23 DIAGNOSIS — Z01.419 ENCOUNTER FOR CERVICAL PAP SMEAR WITH PELVIC EXAM: Primary | ICD-10-CM

## 2024-04-23 DIAGNOSIS — N90.9 LESION OF FEMALE PERINEUM: ICD-10-CM

## 2024-04-23 DIAGNOSIS — Z11.51 ENCOUNTER FOR SCREENING FOR HUMAN PAPILLOMAVIRUS (HPV): ICD-10-CM

## 2024-04-23 DIAGNOSIS — Z12.31 ENCOUNTER FOR SCREENING MAMMOGRAM FOR MALIGNANT NEOPLASM OF BREAST: ICD-10-CM

## 2024-04-23 RX ORDER — ERENUMAB-AOOE 140 MG/ML
INJECTION, SOLUTION SUBCUTANEOUS
COMMUNITY

## 2024-04-23 ASSESSMENT — PATIENT HEALTH QUESTIONNAIRE - PHQ9
SUM OF ALL RESPONSES TO PHQ9 QUESTIONS 1 & 2: 0
1. LITTLE INTEREST OR PLEASURE IN DOING THINGS: NOT AT ALL
2. FEELING DOWN, DEPRESSED OR HOPELESS: NOT AT ALL

## 2024-04-23 NOTE — PROGRESS NOTES
[facility] OB/GYN  CNM Office Note    Jolly Zhu is a 43 y.o. female who presents today for her medical conditions/ complaints as noted below.  Chief Complaint   Patient presents with    Annual Exam       HPI  HPI   Jolly presents for annual gyn exam. She has no complaints. UTD on mammogram.  Reports persistent irritation on mons above clitoris near labial apex. Has tried topical creams. Irritated with clothing and intercourse. This \"spot\" has been there for \"years\".    Last mammogram: 2023  Last pap smear: 23 neg hpv neg    Sexually active: Yes  Sexual preference: Male  Contraception: tl and ablation   : 2  Para: 2  AB: 0  Last bone density: na   Last colonoscopy: na  Menarche: 16  LMP:   Menses: none        Problems/Complaints today:  Patient Active Problem List   Diagnosis    Weakness       No LMP recorded. Patient has had an ablation.      Past Medical History:   Diagnosis Date    Dysphagia     Fibromyalgia     Migraine     Migraines      Past Surgical History:   Procedure Laterality Date     SECTION  ,     CHOLECYSTECTOMY      ENDOMETRIAL ABLATION      ESOPHAGEAL DILATATION      LEG SURGERY      right, mass, normal    LEG SURGERY      TUBAL LIGATION      UPPER GASTROINTESTINAL ENDOSCOPY      [place], Dilation per patient     UPPER GASTROINTESTINAL ENDOSCOPY N/A 2022    RYANN Howell 56 Fr dil, inlet patch in upper esoph,    UPPER GASTROINTESTINAL ENDOSCOPY  2022    Varsha Howell 56 Fr bougie dilation    WISDOM TOOTH EXTRACTION Bilateral      Family History   Problem Relation Age of Onset    High Blood Pressure Mother     Celiac Disease Mother     High Cholesterol Mother     Diabetes Maternal Grandmother     Stroke Maternal Grandmother     Heart Attack Maternal Grandfather     Cancer Paternal Grandfather     Colon Cancer Neg Hx     Esophageal Cancer Neg Hx     Liver Cancer Neg Hx     Rectal Cancer Neg Hx

## 2024-04-23 NOTE — TELEPHONE ENCOUNTER
Requested Prescriptions     Pending Prescriptions Disp Refills    Galcanezumab-gnlm 120 MG/ML SOAJ 1 Adjustable Dose Pre-filled Pen Syringe 11     Sig: Inject 120 mg into the skin every 30 days       Last Office Visit: 9/16/2022  Next Office Visit: Visit date not found  Last Medication Refill: new start  insurance would not cover Aimovig

## 2024-04-23 NOTE — PATIENT INSTRUCTIONS
no more than 1. For some people, no alcohol is the best choice.   Exercise. Get at least 30 minutes of exercise on most days of the week. Walking can be a good choice.     Reach and stay at your healthy weight. This will lower your risk for many health problems.   Take care of your mental health. Try to stay connected with friends, family, and community, and find ways to manage stress.     If you're feeling depressed or hopeless, talk to someone. A counselor can help. If you don't have a counselor, talk to your doctor.   Talk to your doctor if you think you may have a problem with alcohol or drug use. This includes prescription medicines, marijuana, and other drugs.     Avoid tobacco and nicotine: Don't smoke, vape, or chew. If you need help quitting, talk to your doctor.   Practice safer sex. Getting tested, using condoms or dental dams, and limiting sex partners can help prevent STIs.     Use birth control if it's important to you to prevent pregnancy. Talk with your doctor about your choices and what might be best for you.   Prevent problems where you can. Protect your skin from too much sun, wash your hands, brush your teeth twice a day, and wear a seat belt in the car.   Where can you learn more?  Go to https://www.[employer].net/patientEd and enter P072 to learn more about \"Well Visit, Ages 18 to 65: Care Instructions.\"  Current as of: August 6, 2023               Content Version: 14.0  © 1813-2962 [employer].   Care instructions adapted under license by [employer]. If you have questions about a medical condition or this instruction, always ask your healthcare professional. [employer] disclaims any warranty or liability for your use of this information.       Patient Education        Pap Test: Care Instructions  Overview     The Pap test (also called a Pap smear) is a screening test for cancer of the cervix, which is the lower part of the uterus that opens into the vagina. The

## 2024-04-23 NOTE — PROGRESS NOTES
Pt presents today for pap and breast exam.    She also complains of     Last mammogram: 2023  Last pap smear: 23 neg hpv neg    Sexually active: Yes  Sexual preference: Male  Contraception: tl and ablation   : 2  Para: 2  AB: 0  Last bone density: na   Last colonoscopy: na  Menarche: 16  LMP:   Menses: none

## 2024-05-06 ENCOUNTER — TELEPHONE (OUTPATIENT)
Dept: NEUROLOGY | Age: 44
End: 2024-05-06

## 2024-05-06 NOTE — TELEPHONE ENCOUNTER
BONI HARRELL (Key: [id number])  Need Help? Call us at [phone]  Status  Sent to Plan today  Drug  Ubrelvy 100MG tablets    Form  [employer] Electronic PA Form (2017 NCPDP)

## 2024-05-07 NOTE — TELEPHONE ENCOUNTER
BONI HARRELL (Key: [id number])  PA Case ID #: [id number]  Need Help? Call us at [phone]  Outcome  Approved on May 6  Your PA request has been approved. Additional information will be provided in the approval communication. (Message 1148)  Authorization Expiration Date: 5/5/2025  Drug  Ubrelvy 100MG tablets    Form  [employer] Electronic PA Form (2017 NCPDP)

## 2024-06-06 ENCOUNTER — OFFICE VISIT (OUTPATIENT)
Dept: NEUROLOGY | Age: 44
End: 2024-06-06
Payer: COMMERCIAL

## 2024-06-06 VITALS
OXYGEN SATURATION: 98 % | HEIGHT: 61 IN | BODY MASS INDEX: 39.84 KG/M2 | HEART RATE: 109 BPM | WEIGHT: 211 LBS | DIASTOLIC BLOOD PRESSURE: 78 MMHG | SYSTOLIC BLOOD PRESSURE: 122 MMHG

## 2024-06-06 DIAGNOSIS — Z79.899 MEDICATION MANAGEMENT: ICD-10-CM

## 2024-06-06 DIAGNOSIS — G43.009 MIGRAINE WITHOUT AURA AND WITHOUT STATUS MIGRAINOSUS, NOT INTRACTABLE: Primary | ICD-10-CM

## 2024-06-06 PROCEDURE — 99213 OFFICE O/P EST LOW 20 MIN: CPT | Performed by: NURSE PRACTITIONER

## 2024-06-06 NOTE — PROGRESS NOTES
REVIEW OF SYSTEMS    Constitutional: []Fever []Sweat []Chills [] Recent Injury [x] Denies all unless marked  HEENT:[x]Headache  [] Head Injury/Hearing Loss  [] Sore Throat  [] Ear Ache/Dizziness  [x] Denies all unless marked  Spine:  [] Neck pain  [] Back pain  [] Sciaticia  [x] Denies all unless marked  Cardiovascular:[]Heart Disease []Chest Pain [] Palpitations  [x] Denies all unless marked  Pulmonary: []Shortness of Breath []Cough   [x] Denies all unless marke  Gastrointestinal: []Nausea  []Vomiting  []Abdominal Pain  []Constipation  []Diarrhea  []Dark Bloody Stools  [x] Denies all unless marked  Psychiatric/Behavioral:[] Depression [] Anxiety [x] Denies all unless marked  Genitourinary:   [] Frequency  [] Urgency  [] Incontinence [] Pain with Urination  [x] Denies all unless marked  Extremities: []Pain  []Swelling  [x] Denies all unless marked  Musculoskeletal: [] Muscle Pain  [] Joint Pain  [] Arthritis [] Muscle Cramps [] Muscle Twitches  [x] Denies all unless marked  Sleep: [] Insomnia [] Snoring [] Restless Legs [] Sleep Apnea  [] Daytime Sleepiness  [x] Denies all unless marked  Skin:[] Rash [] Skin Discoloration [x] Denies all unless marked   Neurological: []Visual Disturbance/Memory Loss [] Loss of Balance [] Slurred Speech/Weakness [] Seizures  [] Vertigo/Dizziness [x] Denies all unless marked       
  5. Patient advised of the pathophysiology, etiology, and diagnosis of migraines, along with treatment options, and treatment side effects.  6. Return in about 6 months (around 12/6/2024) for Follow up, sooner with worsening symptoms.     DAVID Ramos CNP     I spent 30 minutes caring for Jolly Zhu on this date of service. This time includes time spent by me in the following activities: preparing for the visit, reviewing tests, performing a medically appropriate examination and/or evaluation , counseling and educating the patient/family/caregiver, ordering medications, tests, or procedures, documenting information in the medical record and care coordination.      This dictation was generated by voice recognition computer software.  Although all attempts were made to edit the dictation for accuracy, there may be errors in the transcription that are not intended.

## 2024-08-12 ENCOUNTER — HOSPITAL ENCOUNTER (EMERGENCY)
Age: 44
Discharge: HOME | End: 2024-08-12
Attending: EMERGENCY MEDICINE
Payer: COMMERCIAL

## 2024-08-12 ENCOUNTER — APPOINTMENT (OUTPATIENT)
Dept: CT IMAGING | Age: 44
End: 2024-08-12
Payer: COMMERCIAL

## 2024-08-12 ENCOUNTER — APPOINTMENT (OUTPATIENT)
Dept: GENERAL RADIOLOGY | Age: 44
End: 2024-08-12
Payer: COMMERCIAL

## 2024-08-12 VITALS
TEMPERATURE: 98.8 F | DIASTOLIC BLOOD PRESSURE: 52 MMHG | WEIGHT: 207 LBS | HEART RATE: 97 BPM | HEIGHT: 61 IN | SYSTOLIC BLOOD PRESSURE: 104 MMHG | BODY MASS INDEX: 39.08 KG/M2 | OXYGEN SATURATION: 91 % | RESPIRATION RATE: 23 BRPM

## 2024-08-12 DIAGNOSIS — J18.9 PNEUMONIA OF BOTH LUNGS DUE TO INFECTIOUS ORGANISM, UNSPECIFIED PART OF LUNG: Primary | ICD-10-CM

## 2024-08-12 DIAGNOSIS — R19.7 VOMITING AND DIARRHEA: ICD-10-CM

## 2024-08-12 DIAGNOSIS — R11.10 VOMITING AND DIARRHEA: ICD-10-CM

## 2024-08-12 DIAGNOSIS — E87.6 HYPOKALEMIA: ICD-10-CM

## 2024-08-12 LAB
ALBUMIN SERPL-MCNC: 4.1 G/DL (ref 3.5–5.2)
ALP SERPL-CCNC: 79 U/L (ref 35–104)
ALT SERPL-CCNC: 17 U/L (ref 5–33)
ANION GAP SERPL CALCULATED.3IONS-SCNC: 18 MMOL/L (ref 7–19)
AST SERPL-CCNC: 16 U/L (ref 5–32)
B PARAP IS1001 DNA NPH QL NAA+NON-PROBE: NOT DETECTED
B PERT.PT PRMT NPH QL NAA+NON-PROBE: NOT DETECTED
BACTERIA #/AREA URNS HPF: ABNORMAL /HPF
BASOPHILS # BLD: 0 K/UL (ref 0–0.2)
BASOPHILS NFR BLD: 0.4 % (ref 0–1)
BILIRUB SERPL-MCNC: 0.4 MG/DL (ref 0.2–1.2)
BILIRUB UR STRIP.AUTO-MCNC: ABNORMAL MG/DL
BUN SERPL-MCNC: 13 MG/DL (ref 6–20)
C PNEUM DNA NPH QL NAA+NON-PROBE: NOT DETECTED
CALCIUM SERPL-MCNC: 9.1 MG/DL (ref 8.6–10)
CHLORIDE SERPL-SCNC: 97 MMOL/L (ref 98–111)
CLARITY UR: ABNORMAL
CO2 SERPL-SCNC: 22 MMOL/L (ref 22–29)
COLOR UR: YELLOW
CREAT SERPL-MCNC: 0.8 MG/DL (ref 0.5–0.9)
D DIMER PPP FEU-MCNC: 0.86 UG/ML FEU (ref 0–0.48)
EKG P AXIS: 27 DEGREES
EKG P-R INTERVAL: 118 MS
EKG Q-T INTERVAL: 296 MS
EKG QRS DURATION: 96 MS
EKG QTC CALCULATION (BAZETT): 401 MS
EKG T AXIS: 11 DEGREES
EOSINOPHIL # BLD: 0 K/UL (ref 0–0.6)
EOSINOPHIL NFR BLD: 0.4 % (ref 0–5)
ERYTHROCYTE [DISTWIDTH] IN BLOOD BY AUTOMATED COUNT: 12.5 % (ref 11.5–14.5)
FLUAV RNA NPH QL NAA+NON-PROBE: NOT DETECTED
FLUBV RNA NPH QL NAA+NON-PROBE: NOT DETECTED
GLUCOSE SERPL-MCNC: 114 MG/DL (ref 74–109)
GLUCOSE UR STRIP.AUTO-MCNC: NEGATIVE MG/DL
HADV DNA NPH QL NAA+NON-PROBE: NOT DETECTED
HCG SERPL QL: NEGATIVE
HCOV 229E RNA NPH QL NAA+NON-PROBE: NOT DETECTED
HCOV HKU1 RNA NPH QL NAA+NON-PROBE: NOT DETECTED
HCOV NL63 RNA NPH QL NAA+NON-PROBE: NOT DETECTED
HCOV OC43 RNA NPH QL NAA+NON-PROBE: NOT DETECTED
HCT VFR BLD AUTO: 40.7 % (ref 37–47)
HGB BLD-MCNC: 13.8 G/DL (ref 12–16)
HGB UR STRIP.AUTO-MCNC: ABNORMAL MG/L
HMPV RNA NPH QL NAA+NON-PROBE: NOT DETECTED
HPIV1 RNA NPH QL NAA+NON-PROBE: NOT DETECTED
HPIV2 RNA NPH QL NAA+NON-PROBE: NOT DETECTED
HPIV3 RNA NPH QL NAA+NON-PROBE: NOT DETECTED
HPIV4 RNA NPH QL NAA+NON-PROBE: NOT DETECTED
IMM GRANULOCYTES # BLD: 0 K/UL
KETONES UR STRIP.AUTO-MCNC: 40 MG/DL
LACTATE BLDV-SCNC: 1.6 MG/DL (ref 0.5–1.9)
LEUKOCYTE ESTERASE UR QL STRIP.AUTO: NEGATIVE
LIPASE SERPL-CCNC: 32 U/L (ref 13–60)
LYMPHOCYTES # BLD: 1.5 K/UL (ref 1.1–4.5)
LYMPHOCYTES NFR BLD: 14.2 % (ref 20–40)
M PNEUMO DNA NPH QL NAA+NON-PROBE: NOT DETECTED
MAGNESIUM SERPL-MCNC: 2 MG/DL (ref 1.6–2.6)
MCH RBC QN AUTO: 30.5 PG (ref 27–31)
MCHC RBC AUTO-ENTMCNC: 33.9 G/DL (ref 33–37)
MCV RBC AUTO: 90 FL (ref 81–99)
MONOCYTES # BLD: 1 K/UL (ref 0–0.9)
MONOCYTES NFR BLD: 9.5 % (ref 0–10)
MUCOUS THREADS URNS QL MICRO: ABNORMAL /LPF
NEUTROPHILS # BLD: 8 K/UL (ref 1.5–7.5)
NEUTS SEG NFR BLD: 75.1 % (ref 50–65)
NITRITE UR QL STRIP.AUTO: NEGATIVE
PH UR STRIP.AUTO: 6 [PH] (ref 5–8)
PLATELET # BLD AUTO: 309 K/UL (ref 130–400)
PMV BLD AUTO: 9.5 FL (ref 9.4–12.3)
POTASSIUM SERPL-SCNC: 3 MMOL/L (ref 3.5–5)
PROT SERPL-MCNC: 7.9 G/DL (ref 6.6–8.7)
PROT UR STRIP.AUTO-MCNC: 100 MG/DL
RBC # BLD AUTO: 4.52 M/UL (ref 4.2–5.4)
RBC #/AREA URNS HPF: ABNORMAL /HPF (ref 0–2)
RSV RNA NPH QL NAA+NON-PROBE: NOT DETECTED
RV+EV RNA NPH QL NAA+NON-PROBE: NOT DETECTED
SARS-COV-2 RNA NPH QL NAA+NON-PROBE: NOT DETECTED
SODIUM SERPL-SCNC: 137 MMOL/L (ref 136–145)
SP GR UR STRIP.AUTO: >=1.03 (ref 1–1.03)
SQUAMOUS #/AREA URNS HPF: ABNORMAL /HPF
TROPONIN, HIGH SENSITIVITY: <6 NG/L (ref 0–14)
UROBILINOGEN UR STRIP.AUTO-MCNC: 1 E.U./DL
WBC # BLD AUTO: 10.6 K/UL (ref 4.8–10.8)
WBC #/AREA URNS HPF: ABNORMAL /HPF (ref 0–5)

## 2024-08-12 PROCEDURE — 2580000003 HC RX 258: Performed by: EMERGENCY MEDICINE

## 2024-08-12 PROCEDURE — 83605 ASSAY OF LACTIC ACID: CPT

## 2024-08-12 PROCEDURE — 0202U NFCT DS 22 TRGT SARS-COV-2: CPT

## 2024-08-12 PROCEDURE — 6360000002 HC RX W HCPCS: Performed by: EMERGENCY MEDICINE

## 2024-08-12 PROCEDURE — 93010 ELECTROCARDIOGRAM REPORT: CPT | Performed by: INTERNAL MEDICINE

## 2024-08-12 PROCEDURE — 80053 COMPREHEN METABOLIC PANEL: CPT

## 2024-08-12 PROCEDURE — 84703 CHORIONIC GONADOTROPIN ASSAY: CPT

## 2024-08-12 PROCEDURE — 96375 TX/PRO/DX INJ NEW DRUG ADDON: CPT

## 2024-08-12 PROCEDURE — 96365 THER/PROPH/DIAG IV INF INIT: CPT

## 2024-08-12 PROCEDURE — 83690 ASSAY OF LIPASE: CPT

## 2024-08-12 PROCEDURE — 85025 COMPLETE CBC W/AUTO DIFF WBC: CPT

## 2024-08-12 PROCEDURE — 93005 ELECTROCARDIOGRAM TRACING: CPT | Performed by: EMERGENCY MEDICINE

## 2024-08-12 PROCEDURE — 96361 HYDRATE IV INFUSION ADD-ON: CPT

## 2024-08-12 PROCEDURE — 6370000000 HC RX 637 (ALT 250 FOR IP): Performed by: EMERGENCY MEDICINE

## 2024-08-12 PROCEDURE — 84484 ASSAY OF TROPONIN QUANT: CPT

## 2024-08-12 PROCEDURE — 36415 COLL VENOUS BLD VENIPUNCTURE: CPT

## 2024-08-12 PROCEDURE — 81003 URINALYSIS AUTO W/O SCOPE: CPT

## 2024-08-12 PROCEDURE — 99285 EMERGENCY DEPT VISIT HI MDM: CPT

## 2024-08-12 PROCEDURE — 71045 X-RAY EXAM CHEST 1 VIEW: CPT

## 2024-08-12 PROCEDURE — 71275 CT ANGIOGRAPHY CHEST: CPT

## 2024-08-12 PROCEDURE — 6360000004 HC RX CONTRAST MEDICATION: Performed by: EMERGENCY MEDICINE

## 2024-08-12 PROCEDURE — 83735 ASSAY OF MAGNESIUM: CPT

## 2024-08-12 PROCEDURE — 85379 FIBRIN DEGRADATION QUANT: CPT

## 2024-08-12 RX ORDER — ONDANSETRON 2 MG/ML
4 INJECTION INTRAMUSCULAR; INTRAVENOUS ONCE
Status: COMPLETED | OUTPATIENT
Start: 2024-08-12 | End: 2024-08-12

## 2024-08-12 RX ORDER — ACETAMINOPHEN 500 MG
1000 TABLET ORAL ONCE
Status: COMPLETED | OUTPATIENT
Start: 2024-08-12 | End: 2024-08-12

## 2024-08-12 RX ORDER — POTASSIUM CHLORIDE 20 MEQ/1
40 TABLET, EXTENDED RELEASE ORAL ONCE
Status: COMPLETED | OUTPATIENT
Start: 2024-08-12 | End: 2024-08-12

## 2024-08-12 RX ORDER — AZITHROMYCIN 250 MG/1
TABLET, FILM COATED ORAL
Qty: 1 PACKET | Refills: 0 | Status: SHIPPED | OUTPATIENT
Start: 2024-08-12 | End: 2024-08-16

## 2024-08-12 RX ORDER — ONDANSETRON 4 MG/1
4 TABLET, ORALLY DISINTEGRATING ORAL 3 TIMES DAILY PRN
Qty: 21 TABLET | Refills: 0 | Status: SHIPPED | OUTPATIENT
Start: 2024-08-12

## 2024-08-12 RX ORDER — 0.9 % SODIUM CHLORIDE 0.9 %
30 INTRAVENOUS SOLUTION INTRAVENOUS ONCE
Status: COMPLETED | OUTPATIENT
Start: 2024-08-12 | End: 2024-08-12

## 2024-08-12 RX ORDER — POTASSIUM CHLORIDE 20 MEQ/1
20 TABLET, EXTENDED RELEASE ORAL 2 TIMES DAILY
Qty: 6 TABLET | Refills: 0 | Status: SHIPPED | OUTPATIENT
Start: 2024-08-12 | End: 2024-08-15

## 2024-08-12 RX ADMIN — IOPAMIDOL 90 ML: 755 INJECTION, SOLUTION INTRAVENOUS at 21:34

## 2024-08-12 RX ADMIN — ACETAMINOPHEN 1000 MG: 500 TABLET ORAL at 19:55

## 2024-08-12 RX ADMIN — ONDANSETRON 4 MG: 2 INJECTION INTRAMUSCULAR; INTRAVENOUS at 19:16

## 2024-08-12 RX ADMIN — AZITHROMYCIN MONOHYDRATE 500 MG: 500 INJECTION, POWDER, LYOPHILIZED, FOR SOLUTION INTRAVENOUS at 22:24

## 2024-08-12 RX ADMIN — POTASSIUM CHLORIDE 40 MEQ: 1500 TABLET, EXTENDED RELEASE ORAL at 22:08

## 2024-08-12 RX ADMIN — SODIUM CHLORIDE 2000 ML: 9 INJECTION, SOLUTION INTRAVENOUS at 19:13

## 2024-08-12 RX ADMIN — WATER 1000 MG: 1 INJECTION INTRAMUSCULAR; INTRAVENOUS; SUBCUTANEOUS at 22:26

## 2024-08-12 ASSESSMENT — PAIN - FUNCTIONAL ASSESSMENT: PAIN_FUNCTIONAL_ASSESSMENT: NONE - DENIES PAIN

## 2024-08-12 NOTE — ED PROVIDER NOTES
[facility] EMERGENCY DEPT  eMERGENCY dEPARTMENT eNCOUnter      Pt Name: Jolly Zhu  MRN: [id number]  Birthdate 1980  Date of evaluation: 8/12/2024  Provider: Anali Jameson DO    CHIEF COMPLAINT       Chief Complaint   Patient presents with    Shortness of Breath    Emesis    Cough     Pt presents to ED with c/o cough emesis and fever since Thursday.          HISTORY OF PRESENT ILLNESS   (Location/Symptom, Timing/Onset,Context/Setting, Quality, Duration, Modifying Factors, Severity)  Note limiting factors.   Jolly Zhu is a 44 y.o. female who presents to the emergency department      The patient is a 44-year-old female who presents the emergency department complaining of shortness of breath.  She says she is feeling poorly and asked that her  provide most of the history.  He says that she started feeling bad Thursday (4 days ago).  She was up all night with intermittent chills and sweating.  She had a low-grade temperature.  The next day she was feeling worse with a cough.  She started to feel short of breath.  She has had some intermittent vomiting associated with migraine headaches.  The migraines have been typical for her usual migraine suspected to have been triggered by illness.  The patient says today she was feeling quite a bit better and thinks she overdid it at home and she was much more active than usual.  She says she started to feel worse as the day went on.  She says she has had a lot of diarrhea.  She gets some discomfort in her chest with coughing and breathing.   at bedside said she coughed up a little bit of blood and was feeling more short of breath prompting him to bring her to the emergency department.  Patient and  do note that the patient had the same symptoms last year and she was diagnosed with RSV.  She has no history of COPD or asthma.  No known lung disease.  She does not smoke.  She has had some urinary frequency.  When asked if she has bodyaches she reports she has a

## 2024-09-13 DIAGNOSIS — G43.009 MIGRAINE WITHOUT AURA AND WITHOUT STATUS MIGRAINOSUS, NOT INTRACTABLE: ICD-10-CM

## 2024-09-13 RX ORDER — UBROGEPANT 100 MG/1
TABLET ORAL
Qty: 10 TABLET | Refills: 0 | Status: SHIPPED | OUTPATIENT
Start: 2024-09-13

## 2024-10-23 DIAGNOSIS — G43.009 MIGRAINE WITHOUT AURA AND WITHOUT STATUS MIGRAINOSUS, NOT INTRACTABLE: ICD-10-CM

## 2024-10-23 RX ORDER — UBROGEPANT 100 MG/1
TABLET ORAL
Qty: 10 TABLET | Refills: 5 | Status: SHIPPED | OUTPATIENT
Start: 2024-10-23

## 2024-10-23 NOTE — TELEPHONE ENCOUNTER
Requested Prescriptions     Pending Prescriptions Disp Refills    Ubrogepant (UBRELVY) 100 MG TABS 10 tablet 5     Sig: TAKE ONE TABLET BY MOUTH AT THE ONSET OF MIGRAINE. MAY REPEAT ONCE IN 2 HOURS IF NO IMPROVEMENT. DO NOT EXCEED 2 TABLETS IN 24 HOURS       Last Office Visit: 6/6/2024  Next Office Visit: 12/6/2024  Last Medication Refill:9/13/24    Auth on file good til 5/6/2025.

## 2024-11-22 ENCOUNTER — HOSPITAL ENCOUNTER (OUTPATIENT)
Dept: WOMENS IMAGING | Age: 44
Discharge: HOME | End: 2024-11-22
Attending: ADVANCED PRACTICE MIDWIFE
Payer: COMMERCIAL

## 2024-11-22 DIAGNOSIS — Z12.31 ENCOUNTER FOR SCREENING MAMMOGRAM FOR MALIGNANT NEOPLASM OF BREAST: ICD-10-CM

## 2024-11-22 PROCEDURE — 77063 BREAST TOMOSYNTHESIS BI: CPT

## 2024-12-06 ENCOUNTER — OFFICE VISIT (OUTPATIENT)
Dept: NEUROLOGY | Age: 44
End: 2024-12-06
Payer: COMMERCIAL

## 2024-12-06 VITALS
WEIGHT: 207 LBS | SYSTOLIC BLOOD PRESSURE: 112 MMHG | BODY MASS INDEX: 39.08 KG/M2 | DIASTOLIC BLOOD PRESSURE: 66 MMHG | OXYGEN SATURATION: 98 % | HEIGHT: 61 IN

## 2024-12-06 DIAGNOSIS — G43.009 MIGRAINE WITHOUT AURA AND WITHOUT STATUS MIGRAINOSUS, NOT INTRACTABLE: Primary | ICD-10-CM

## 2024-12-06 DIAGNOSIS — Z79.899 MEDICATION MANAGEMENT: ICD-10-CM

## 2024-12-06 DIAGNOSIS — R25.2 MUSCLE CRAMPING: ICD-10-CM

## 2024-12-06 PROCEDURE — 99214 OFFICE O/P EST MOD 30 MIN: CPT | Performed by: NURSE PRACTITIONER

## 2024-12-06 NOTE — PROGRESS NOTES
[facility] NEUROLOGY:    Patient: Jolly Zhu   :  1980  Age:  44 y.o.  MRN:  [id number]  Today:  22    Provider: DAVID Ramos    Chief Complaint:  Chief Complaint   Patient presents with    Follow-up    Migraine     Pt states migraines had been good until weather changes. Last one a few days ago.          HISTORY OF PRESENT ILLNESS:   Jolly Zhu is a 44 y.o. year old female here for follow up of migraines. She is stable overall. Migraines overall well controlled. Having about 2-3 migraines per month, typically triggered by stress or weather changes. Ubrelvy working well when she takes it. No significant changes to headache characteristics, does feel more pressure behind her left eye at times than before. She notes that she is having what is described as cramping to fingers and feet/toes occurring several nights a month. Could correlate this to busy days and perhaps dehydration. She is no longer on the Aimovig injections due to cost. Qulipta worked well but was denied by insurance. Emgality not covered by insurance. In the past she was also started on Topamax but we discontinued this due to side effect issues including numbness and tingling.  Amitriptyline was sedating for her.  She is using rizatriptan with benefit, has tried Imitrex in the past as well as side effect issues Still notes pain is retro orbital or frontal with radiation globally. There is some pain to occipital area as well. She has associated nausea, photophobia and phonophobia. She has episodic left sided numbness as well that is not always associated with headaches. There is history of fibromyalgia.      Additional Relevant History:  History of head/neck trauma: No  History of head/neck surgery:  No  Family h/o headaches or aneurysm: No      PAST MEDICAL HISTORY:    Medical History:      Diagnosis Date    Dysphagia     Fibromyalgia     Migraine     Migraines        Surgical History:      Procedure Laterality Date

## 2025-04-23 NOTE — PATIENT INSTRUCTIONS

## 2025-04-29 ENCOUNTER — OFFICE VISIT (OUTPATIENT)
Dept: OBGYN CLINIC | Age: 45
End: 2025-04-29
Payer: COMMERCIAL

## 2025-04-29 VITALS
DIASTOLIC BLOOD PRESSURE: 88 MMHG | HEIGHT: 61 IN | BODY MASS INDEX: 39.65 KG/M2 | WEIGHT: 210 LBS | HEART RATE: 68 BPM | SYSTOLIC BLOOD PRESSURE: 124 MMHG

## 2025-04-29 DIAGNOSIS — Z12.4 ENCOUNTER FOR SCREENING FOR CERVICAL CANCER: ICD-10-CM

## 2025-04-29 DIAGNOSIS — Z01.419 ENCOUNTER FOR CERVICAL PAP SMEAR WITH PELVIC EXAM: Primary | ICD-10-CM

## 2025-04-29 DIAGNOSIS — Z11.51 ENCOUNTER FOR SCREENING FOR HUMAN PAPILLOMAVIRUS (HPV): ICD-10-CM

## 2025-04-29 DIAGNOSIS — Z12.31 ENCOUNTER FOR SCREENING MAMMOGRAM FOR MALIGNANT NEOPLASM OF BREAST: ICD-10-CM

## 2025-04-29 PROCEDURE — 99396 PREV VISIT EST AGE 40-64: CPT | Performed by: ADVANCED PRACTICE MIDWIFE

## 2025-04-29 NOTE — PROGRESS NOTES
Pt presents today for pap smear and breast exam.    She also complains of     Last mammogram: 2024  Last pap smear: 2024,  negative  Sexually active: Yes  Sexual preference: Male  Contraception: tl and ablation  : 2  Para: 2  AB: 0  Last bone density: never   Last colonoscopy: never  Menarche: 16 yrs old  LMP:   Menses: none

## 2025-04-29 NOTE — PROGRESS NOTES
[facility] OB/GYN  CNM Office Note    Jolly Zhu is a 44 y.o. female who presents today for her medical conditions/ complaints as noted below.  Chief Complaint   Patient presents with    Annual Exam       HPI  Jolly presents for her annual GYN exam. Denies pain. No sexual concerns. S/p ablation with no menses.          Last mammogram: 2024  Last pap smear: 2024,  negative  Sexually active: Yes  Sexual preference: Male  Contraception: tl and ablation  : 2  Para: 2  AB: 0  Last bone density: never   Last colonoscopy: never  Menarche: 16 yrs old  LMP:   Menses: none       Problems/Complaints today:  Patient Active Problem List   Diagnosis    Weakness       No LMP recorded. Patient has had an ablation.      Past Medical History:   Diagnosis Date    Dysphagia     Fibromyalgia     Migraine     Migraines      Past Surgical History:   Procedure Laterality Date     SECTION  ,     CHOLECYSTECTOMY  2009    ENDOMETRIAL ABLATION      ESOPHAGEAL DILATATION      LEG SURGERY      right, mass, normal    LEG SURGERY      TUBAL LIGATION      UPPER GASTROINTESTINAL ENDOSCOPY      [place], Dilation per patient     UPPER GASTROINTESTINAL ENDOSCOPY N/A 2022    RYANN Howell 56 Fr dil, inlet patch in upper esoph,    UPPER GASTROINTESTINAL ENDOSCOPY  2022    Varsha Howell 56 Fr bougie dilation    WISDOM TOOTH EXTRACTION Bilateral      Family History   Problem Relation Age of Onset    High Blood Pressure Mother     Celiac Disease Mother     High Cholesterol Mother     Diabetes Maternal Grandmother     Stroke Maternal Grandmother     Heart Attack Maternal Grandfather     Cancer Paternal Grandfather     Colon Cancer Neg Hx     Esophageal Cancer Neg Hx     Liver Cancer Neg Hx     Rectal Cancer Neg Hx     Stomach Cancer Neg Hx      Social History     Tobacco Use    Smoking status: Never     Passive exposure: Never    Smokeless tobacco:

## 2025-05-04 ENCOUNTER — RESULTS FOLLOW-UP (OUTPATIENT)
Dept: OBGYN CLINIC | Age: 45
End: 2025-05-04

## 2025-05-07 ENCOUNTER — TELEPHONE (OUTPATIENT)
Dept: NEUROLOGY | Age: 45
End: 2025-05-07

## 2025-05-07 NOTE — TELEPHONE ENCOUNTER
BONI HARRELL (Key: [id number])  PA Case ID #: [id number]  Need Help? Call us at [phone]  Outcome  [facility]  Your PA request has been approved. Additional information will be provided in the approval communication. (Message 1142)  Effective Date: 5/7/2025  Authorization Expiration Date: 5/7/2026  Drug  Ubrelvy 100MG tablets    Form  [employer] Electronic PA Form (2017 NCPDP)

## 2025-06-05 ENCOUNTER — TELEPHONE (OUTPATIENT)
Dept: NEUROLOGY | Age: 45
End: 2025-06-05

## 2025-06-05 DIAGNOSIS — G43.009 MIGRAINE WITHOUT AURA AND WITHOUT STATUS MIGRAINOSUS, NOT INTRACTABLE: ICD-10-CM

## 2025-06-05 RX ORDER — UBROGEPANT 100 MG/1
TABLET ORAL
Qty: 10 TABLET | Refills: 5 | Status: CANCELLED | OUTPATIENT
Start: 2025-06-05

## 2025-06-06 DIAGNOSIS — G43.009 MIGRAINE WITHOUT AURA AND WITHOUT STATUS MIGRAINOSUS, NOT INTRACTABLE: ICD-10-CM

## 2025-06-06 RX ORDER — UBROGEPANT 100 MG/1
TABLET ORAL
Qty: 16 TABLET | Refills: 5 | Status: SHIPPED | OUTPATIENT
Start: 2025-06-06

## (undated) DEVICE — ENDO KIT,LOURDES HOSPITAL: Brand: MEDLINE INDUSTRIES, INC.

## (undated) DEVICE — FORCEPS BX L240CM JAW DIA2.4MM ORNG L CAP W/ NDL DISP RAD